# Patient Record
Sex: FEMALE | Race: WHITE | NOT HISPANIC OR LATINO | Employment: FULL TIME | URBAN - METROPOLITAN AREA
[De-identification: names, ages, dates, MRNs, and addresses within clinical notes are randomized per-mention and may not be internally consistent; named-entity substitution may affect disease eponyms.]

---

## 2019-03-27 ENCOUNTER — OFFICE VISIT (OUTPATIENT)
Dept: URGENT CARE | Facility: PHYSICIAN GROUP | Age: 39
End: 2019-03-27
Payer: COMMERCIAL

## 2019-03-27 VITALS
SYSTOLIC BLOOD PRESSURE: 108 MMHG | DIASTOLIC BLOOD PRESSURE: 70 MMHG | TEMPERATURE: 99.2 F | HEART RATE: 88 BPM | BODY MASS INDEX: 33.13 KG/M2 | RESPIRATION RATE: 13 BRPM | WEIGHT: 180 LBS | OXYGEN SATURATION: 98 % | HEIGHT: 62 IN

## 2019-03-27 DIAGNOSIS — M54.50 ACUTE RIGHT-SIDED LOW BACK PAIN WITHOUT SCIATICA: ICD-10-CM

## 2019-03-27 LAB
APPEARANCE UR: CLEAR
BILIRUB UR STRIP-MCNC: NEGATIVE MG/DL
COLOR UR AUTO: YELLOW
GLUCOSE UR STRIP.AUTO-MCNC: NEGATIVE MG/DL
KETONES UR STRIP.AUTO-MCNC: NEGATIVE MG/DL
LEUKOCYTE ESTERASE UR QL STRIP.AUTO: NEGATIVE
NITRITE UR QL STRIP.AUTO: NEGATIVE
PH UR STRIP.AUTO: 7 [PH] (ref 5–8)
PROT UR QL STRIP: NEGATIVE MG/DL
RBC UR QL AUTO: NORMAL
SP GR UR STRIP.AUTO: 1.02
UROBILINOGEN UR STRIP-MCNC: 0.2 MG/DL

## 2019-03-27 PROCEDURE — 99203 OFFICE O/P NEW LOW 30 MIN: CPT | Performed by: PHYSICIAN ASSISTANT

## 2019-03-27 PROCEDURE — 81002 URINALYSIS NONAUTO W/O SCOPE: CPT | Performed by: PHYSICIAN ASSISTANT

## 2019-03-27 ASSESSMENT — ENCOUNTER SYMPTOMS
HEADACHES: 0
FEVER: 0
DIARRHEA: 0
VOMITING: 0
MYALGIAS: 0
ABDOMINAL PAIN: 0

## 2019-03-27 NOTE — PROGRESS NOTES
Subjective:      Johanne Raya is a 39 y.o. female who presents with Flank Pain (Right side onset 9am)            Back Pain   This is a new problem. The current episode started today (approx. 9am). The problem occurs constantly. The problem has been gradually worsening since onset. The pain is present in the lumbar spine. The pain does not radiate. The pain is moderate. The symptoms are aggravated by twisting and position. Pertinent negatives include no abdominal pain, bladder incontinence, bowel incontinence, chest pain, dysuria, fever, headaches, numbness or weakness. She has tried nothing for the symptoms. The treatment provided mild relief.     Patient presents to clinic c/o right sided back/flank pain since 9am. She had a similar pain 1 month prior and was worked up for kidney stones. She reports she didn't have kidney stones at that time, and was diagnosed with a UTI. She reports this pain feels similar, but states she doesn't believe it is kidney stones as there were no stones seen of her previous CT scan. She also reports this pain does not feel like kidney stones. She states her pain is worse with movement and position. She denies radiation of pain, numbness/weakness, of her lower extremities, incontinence of bladder and bowel, and fever.    PMH:  has no past medical history on file.  MEDS: No current outpatient prescriptions on file.  ALLERGIES: No Known Allergies  SURGHX: No past surgical history on file.  SOCHX:  reports that she has been smoking.  She has never used smokeless tobacco.  FH: Family history was reviewed, no pertinent findings to report      Review of Systems   Constitutional: Negative for fever.   HENT: Negative for congestion, ear pain and sore throat.    Eyes: Negative for discharge.   Respiratory: Negative for cough.    Cardiovascular: Negative for chest pain.   Gastrointestinal: Negative for abdominal pain, bowel incontinence, diarrhea and vomiting.   Genitourinary: Positive for flank  "pain (right side). Negative for bladder incontinence, dysuria, frequency and urgency.   Musculoskeletal: Positive for back pain. Negative for myalgias.   Skin: Negative for rash.   Neurological: Negative for focal weakness, weakness, numbness and headaches.          Objective:     /70   Pulse 88   Temp 37.3 °C (99.2 °F)   Resp 13   Ht 1.575 m (5' 2\")   Wt 81.6 kg (180 lb)   SpO2 98%   BMI 32.92 kg/m²      Physical Exam   Constitutional: She is oriented to person, place, and time. She appears well-developed and well-nourished. No distress.   HENT:   Head: Normocephalic and atraumatic.   Right Ear: External ear normal.   Left Ear: External ear normal.   Nose: Nose normal.   Mouth/Throat: Oropharynx is clear and moist.   Eyes: Conjunctivae and EOM are normal.   Neck: Normal range of motion. Neck supple.   Cardiovascular: Normal rate, regular rhythm and normal heart sounds.    Abdominal: There is no tenderness. There is no CVA tenderness.   Musculoskeletal: Normal range of motion. She exhibits no edema.        Arms:  Lumbar Back-   Mild tenderness to right paraspinal region near   Bilateral Lower Extremities  Strength 5/5  ROM intact  Neurovascular intact   Straight Leg Raise - Negative    Neurological: She is alert and oriented to person, place, and time.   Skin: Skin is warm and dry.            Progress:   POCT Urinalysis:  GLU: Negative  ACOSTA: Negative  KET: Negative  S.020  BLO: Trace - patient states she is currently on her period  PH: 7.0  PRO: Negative  URO: 0.2  NIT: Negative  MARTIN: Negative    Urine Culture - pending   Will call patient with the results only if positive and the patient requires antibiotics.        Assessment/Plan:     1. Acute Low Back Pain - Right Side  The patient's presenting symptoms and physical exam are consistent with an acute strain for her lower back. Given the patient is currently not experiencing radiation of pain, numbness/weakness of her lower extremities, " incontinence of bladder/bowel, or saddle paresthesias, and the presence of no focal neurological deficits on physical exam, it is unlikely her symptoms are due to an acute neurological process. The patient is also had a negative straight leg test on physical exam, indicating sciatica is less likely. Given the patient's urinalysis was negative today in clinic, it is unlikely her symptoms are due to an acute urinary tract infection. Additionally, the patient is not experiencing any abdominal pain, dysuria, or fever, and absence of abdominal tenderness on physical exam, which also indicates a UTI is less likely. Will the culture the patient's urine to rule out all other bacterial sources.   Plan:  OTC NSAIDs for pain/discomfort  Alternate ice and heat for symptomatic relief  Follow-up with PCP   Return to clinic or go to the ED if symptoms worsen or fail to improve, or if patient should develop worsening/increasing back pain, numbness/tingling/weakness to her extremities, incontinence of bladder/bowel, saddle paresthesias, radiation of pain, abdominal pain, urinary symptoms, and/or fever.    Discussed plan with the patient, and she agrees to the above.

## 2019-03-28 ASSESSMENT — ENCOUNTER SYMPTOMS
FLANK PAIN: 1
BOWEL INCONTINENCE: 0
FOCAL WEAKNESS: 0
NUMBNESS: 0
BACK PAIN: 1
COUGH: 0
WEAKNESS: 0
EYE DISCHARGE: 0
SORE THROAT: 0

## 2019-09-13 ENCOUNTER — HOSPITAL ENCOUNTER (EMERGENCY)
Facility: MEDICAL CENTER | Age: 39
End: 2019-09-13
Attending: EMERGENCY MEDICINE
Payer: COMMERCIAL

## 2019-09-13 ENCOUNTER — APPOINTMENT (OUTPATIENT)
Dept: RADIOLOGY | Facility: MEDICAL CENTER | Age: 39
End: 2019-09-13
Attending: EMERGENCY MEDICINE
Payer: COMMERCIAL

## 2019-09-13 VITALS
OXYGEN SATURATION: 93 % | HEART RATE: 101 BPM | SYSTOLIC BLOOD PRESSURE: 101 MMHG | HEIGHT: 62 IN | TEMPERATURE: 98.1 F | RESPIRATION RATE: 16 BRPM | WEIGHT: 210 LBS | BODY MASS INDEX: 38.64 KG/M2 | DIASTOLIC BLOOD PRESSURE: 61 MMHG

## 2019-09-13 DIAGNOSIS — R00.0 TACHYCARDIA: ICD-10-CM

## 2019-09-13 DIAGNOSIS — R10.32 LLQ ABDOMINAL PAIN: ICD-10-CM

## 2019-09-13 DIAGNOSIS — R19.7 DIARRHEA, UNSPECIFIED TYPE: ICD-10-CM

## 2019-09-13 LAB
ALBUMIN SERPL BCP-MCNC: 4.7 G/DL (ref 3.2–4.9)
ALBUMIN/GLOB SERPL: 1.6 G/DL
ALP SERPL-CCNC: 63 U/L (ref 30–99)
ALT SERPL-CCNC: 21 U/L (ref 2–50)
ANION GAP SERPL CALC-SCNC: 13 MMOL/L (ref 0–11.9)
APPEARANCE UR: ABNORMAL
AST SERPL-CCNC: 16 U/L (ref 12–45)
BACTERIA #/AREA URNS HPF: ABNORMAL /HPF
BASOPHILS # BLD AUTO: 0.8 % (ref 0–1.8)
BASOPHILS # BLD: 0.09 K/UL (ref 0–0.12)
BILIRUB SERPL-MCNC: 0.3 MG/DL (ref 0.1–1.5)
BILIRUB UR QL STRIP.AUTO: NEGATIVE
BUN SERPL-MCNC: 16 MG/DL (ref 8–22)
CALCIUM SERPL-MCNC: 9 MG/DL (ref 8.5–10.5)
CHLORIDE SERPL-SCNC: 104 MMOL/L (ref 96–112)
CO2 SERPL-SCNC: 23 MMOL/L (ref 20–33)
COLOR UR: YELLOW
CREAT SERPL-MCNC: 0.97 MG/DL (ref 0.5–1.4)
EOSINOPHIL # BLD AUTO: 0.37 K/UL (ref 0–0.51)
EOSINOPHIL NFR BLD: 3.3 % (ref 0–6.9)
EPI CELLS #/AREA URNS HPF: ABNORMAL /HPF
ERYTHROCYTE [DISTWIDTH] IN BLOOD BY AUTOMATED COUNT: 46.4 FL (ref 35.9–50)
GLOBULIN SER CALC-MCNC: 3 G/DL (ref 1.9–3.5)
GLUCOSE SERPL-MCNC: 99 MG/DL (ref 65–99)
GLUCOSE UR STRIP.AUTO-MCNC: NEGATIVE MG/DL
HCG SERPL QL: NEGATIVE
HCT VFR BLD AUTO: 47.2 % (ref 37–47)
HGB BLD-MCNC: 14.9 G/DL (ref 12–16)
HYALINE CASTS #/AREA URNS LPF: ABNORMAL /LPF
IMM GRANULOCYTES # BLD AUTO: 0.11 K/UL (ref 0–0.11)
IMM GRANULOCYTES NFR BLD AUTO: 1 % (ref 0–0.9)
KETONES UR STRIP.AUTO-MCNC: NEGATIVE MG/DL
LEUKOCYTE ESTERASE UR QL STRIP.AUTO: NEGATIVE
LIPASE SERPL-CCNC: 181 U/L (ref 11–82)
LYMPHOCYTES # BLD AUTO: 3.69 K/UL (ref 1–4.8)
LYMPHOCYTES NFR BLD: 33.1 % (ref 22–41)
MCH RBC QN AUTO: 29.7 PG (ref 27–33)
MCHC RBC AUTO-ENTMCNC: 31.6 G/DL (ref 33.6–35)
MCV RBC AUTO: 94.2 FL (ref 81.4–97.8)
MICRO URNS: ABNORMAL
MONOCYTES # BLD AUTO: 0.71 K/UL (ref 0–0.85)
MONOCYTES NFR BLD AUTO: 6.4 % (ref 0–13.4)
NEUTROPHILS # BLD AUTO: 6.18 K/UL (ref 2–7.15)
NEUTROPHILS NFR BLD: 55.4 % (ref 44–72)
NITRITE UR QL STRIP.AUTO: NEGATIVE
NRBC # BLD AUTO: 0 K/UL
NRBC BLD-RTO: 0 /100 WBC
PH UR STRIP.AUTO: 5 [PH] (ref 5–8)
PLATELET # BLD AUTO: 434 K/UL (ref 164–446)
PMV BLD AUTO: 9.6 FL (ref 9–12.9)
POTASSIUM SERPL-SCNC: 3.8 MMOL/L (ref 3.6–5.5)
PROT SERPL-MCNC: 7.7 G/DL (ref 6–8.2)
PROT UR QL STRIP: NEGATIVE MG/DL
RBC # BLD AUTO: 5.01 M/UL (ref 4.2–5.4)
RBC # URNS HPF: ABNORMAL /HPF
RBC UR QL AUTO: NEGATIVE
SODIUM SERPL-SCNC: 140 MMOL/L (ref 135–145)
SP GR UR STRIP.AUTO: 1.01
UROBILINOGEN UR STRIP.AUTO-MCNC: 0.2 MG/DL
WBC # BLD AUTO: 11.2 K/UL (ref 4.8–10.8)
WBC #/AREA URNS HPF: ABNORMAL /HPF

## 2019-09-13 PROCEDURE — 83690 ASSAY OF LIPASE: CPT

## 2019-09-13 PROCEDURE — 99285 EMERGENCY DEPT VISIT HI MDM: CPT

## 2019-09-13 PROCEDURE — 700111 HCHG RX REV CODE 636 W/ 250 OVERRIDE (IP): Performed by: EMERGENCY MEDICINE

## 2019-09-13 PROCEDURE — 85025 COMPLETE CBC W/AUTO DIFF WBC: CPT

## 2019-09-13 PROCEDURE — 96375 TX/PRO/DX INJ NEW DRUG ADDON: CPT

## 2019-09-13 PROCEDURE — 81001 URINALYSIS AUTO W/SCOPE: CPT

## 2019-09-13 PROCEDURE — 74176 CT ABD & PELVIS W/O CONTRAST: CPT

## 2019-09-13 PROCEDURE — 80053 COMPREHEN METABOLIC PANEL: CPT

## 2019-09-13 PROCEDURE — 96374 THER/PROPH/DIAG INJ IV PUSH: CPT

## 2019-09-13 PROCEDURE — 84703 CHORIONIC GONADOTROPIN ASSAY: CPT

## 2019-09-13 RX ORDER — DICYCLOMINE HCL 20 MG
20 TABLET ORAL EVERY 6 HOURS PRN
Qty: 10 TAB | Refills: 1 | Status: SHIPPED | OUTPATIENT
Start: 2019-09-13 | End: 2021-09-09

## 2019-09-13 RX ORDER — KETOROLAC TROMETHAMINE 30 MG/ML
30 INJECTION, SOLUTION INTRAMUSCULAR; INTRAVENOUS ONCE
Status: COMPLETED | OUTPATIENT
Start: 2019-09-13 | End: 2019-09-13

## 2019-09-13 RX ORDER — ONDANSETRON 4 MG/1
4 TABLET, ORALLY DISINTEGRATING ORAL EVERY 8 HOURS PRN
Qty: 8 TAB | Refills: 1 | Status: SHIPPED | OUTPATIENT
Start: 2019-09-13 | End: 2021-09-09

## 2019-09-13 RX ORDER — ONDANSETRON 2 MG/ML
4 INJECTION INTRAMUSCULAR; INTRAVENOUS ONCE
Status: COMPLETED | OUTPATIENT
Start: 2019-09-13 | End: 2019-09-13

## 2019-09-13 RX ORDER — MORPHINE SULFATE 4 MG/ML
2 INJECTION, SOLUTION INTRAMUSCULAR; INTRAVENOUS ONCE
Status: COMPLETED | OUTPATIENT
Start: 2019-09-13 | End: 2019-09-13

## 2019-09-13 RX ADMIN — KETOROLAC TROMETHAMINE 30 MG: 30 INJECTION, SOLUTION INTRAMUSCULAR at 20:49

## 2019-09-13 RX ADMIN — ONDANSETRON 4 MG: 2 INJECTION INTRAMUSCULAR; INTRAVENOUS at 21:25

## 2019-09-13 RX ADMIN — MORPHINE SULFATE 2 MG: 4 INJECTION INTRAVENOUS at 21:25

## 2019-09-13 ASSESSMENT — PAIN SCALES - WONG BAKER
WONGBAKER_NUMERICALRESPONSE: DOESN'T HURT AT ALL
WONGBAKER_NUMERICALRESPONSE: HURTS AS MUCH AS POSSIBLE

## 2019-09-14 NOTE — DISCHARGE INSTRUCTIONS
See a doctor for recheck in 2 days if no improvement    Return sooner if worse or for any concerns

## 2019-09-14 NOTE — ED NOTES
Patient roomed, agree with triage note, patient resting in bed, pain located in LUQ, intermittent, cramping feeling, acute onset

## 2019-09-14 NOTE — ED NOTES
The patient has been provided with discharge education and information.  The patient was also provided with instructions on follow up care and return precautions.  The patient verbalizes understanding of discharge instructions, follow up care, and return precautions.  All questions have been answered.  Two RX written by ERP.  YUNI, ALOK/Ox4, good color and appropriate at time of discharge.  Patient ambulated steady gait  out of department.   is driving patient home.  ERP aware of HR

## 2019-09-14 NOTE — ED TRIAGE NOTES
"Chief Complaint   Patient presents with   • LLQ Pain     PT reports stabbing pain to LLQ, reports hx kidney stones. Denies trauma to area. Pain started 1 hour PTA. Pt denies blood in urine, denies difficulty urinating. Denies abnormal vaginal discharge.     /110   Pulse (!) 146   Temp 36.7 °C (98.1 °F) (Temporal)   Resp 20 Comment: Pt. Crying  Ht 1.575 m (5' 2\")   Wt 95.3 kg (210 lb)   SpO2 92%   BMI 38.41 kg/m²     PT to ER for above complaint.    R /110.   L /104.     Protocol ordered. Triage processed explained.   "

## 2019-09-14 NOTE — ED PROVIDER NOTES
ED Provider Note    CHIEF COMPLAINT  Chief Complaint   Patient presents with   • LLQ Pain     PT reports stabbing pain to LLQ, reports hx kidney stones. Denies trauma to area. Pain started 1 hour PTA. Pt denies blood in urine, denies difficulty urinating. Denies abnormal vaginal discharge.       HPI  Johanne Raya is a 39 y.o. female who presents with complaint of left lower quadrant abdominal pain intermittently radiating to the left flank and suprapubically.  She states cramping is severe, sharp in nature, and intermittent.  She has had 6 loose bowel movements today.  Symptoms are sudden onset today.  She states she felt well this morning when she got up.  No chest pain, no shortness of breath.  She denies fever or chills.  No vomiting.    REVIEW OF SYSTEMS  Constitutional: No fever  Respiratory: No shortness of breath  Cardiac: No chest pain or syncope  Gastrointestinal: Abdominal cramping and pain, diarrhea.  Prior history of pancreatitis.  She is previously had her appendix removed.  Musculoskeletal: Intermittent left flank pain  Neurologic: No headache  Genitourinary: Denies dysuria.  Distant history of kidney stone 8 years ago       All other systems are negative.     PAST MEDICAL HISTORY  History reviewed. No pertinent past medical history.    FAMILY HISTORY  History reviewed. No pertinent family history.    SOCIAL HISTORY  Social History     Socioeconomic History   • Marital status: Single     Spouse name: Not on file   • Number of children: Not on file   • Years of education: Not on file   • Highest education level: Not on file   Occupational History   • Not on file   Social Needs   • Financial resource strain: Not on file   • Food insecurity:     Worry: Not on file     Inability: Not on file   • Transportation needs:     Medical: Not on file     Non-medical: Not on file   Tobacco Use   • Smoking status: Current Every Day Smoker   • Smokeless tobacco: Never Used   Substance and Sexual Activity   • Alcohol  "use: Yes   • Drug use: Never   • Sexual activity: Not on file   Lifestyle   • Physical activity:     Days per week: Not on file     Minutes per session: Not on file   • Stress: Not on file   Relationships   • Social connections:     Talks on phone: Not on file     Gets together: Not on file     Attends Congregational service: Not on file     Active member of club or organization: Not on file     Attends meetings of clubs or organizations: Not on file     Relationship status: Not on file   • Intimate partner violence:     Fear of current or ex partner: Not on file     Emotionally abused: Not on file     Physically abused: Not on file     Forced sexual activity: Not on file   Other Topics Concern   • Not on file   Social History Narrative   • Not on file       SURGICAL HISTORY  History reviewed. No pertinent surgical history.    CURRENT MEDICATIONS  Home Medications    **Home medications have not yet been reviewed for this encounter**         ALLERGIES  No Known Allergies    PHYSICAL EXAM  VITAL SIGNS: /110   Pulse (!) 146   Temp 36.7 °C (98.1 °F) (Temporal)   Resp 20 Comment: Pt. Crying  Ht 1.575 m (5' 2\")   Wt 95.3 kg (210 lb)   SpO2 92%   BMI 38.41 kg/m²   Constitutional: Well-nourished, moderate distress  ENT: Nares clear, mucous membranes moist.  Eyes:  Conjunctiva normal, No discharge.    Lymphatic: No adenopathy.   Cardiovascular: Tachycardic heart rate, Normal rhythm.   Pulmonary: No wheezing, no rales  Gastrointestinal: Abdomen is soft, tender suprapubic and left lower quadrant, no peritoneal signs.  Bowel sounds active  Skin: Warm, Dry, No jaundice.   Musculoskeletal:  No CVA tenderness.   Neurologic:  Normal motor and sensory function, No focal deficits noted.   Psychiatric:Normal affect, Normal mood.    RADIOLOGY/PROCEDURES/Labs  Results for orders placed or performed during the hospital encounter of 09/13/19   CBC WITH DIFFERENTIAL   Result Value Ref Range    WBC 11.2 (H) 4.8 - 10.8 K/uL    RBC " 5.01 4.20 - 5.40 M/uL    Hemoglobin 14.9 12.0 - 16.0 g/dL    Hematocrit 47.2 (H) 37.0 - 47.0 %    MCV 94.2 81.4 - 97.8 fL    MCH 29.7 27.0 - 33.0 pg    MCHC 31.6 (L) 33.6 - 35.0 g/dL    RDW 46.4 35.9 - 50.0 fL    Platelet Count 434 164 - 446 K/uL    MPV 9.6 9.0 - 12.9 fL    Neutrophils-Polys 55.40 44.00 - 72.00 %    Lymphocytes 33.10 22.00 - 41.00 %    Monocytes 6.40 0.00 - 13.40 %    Eosinophils 3.30 0.00 - 6.90 %    Basophils 0.80 0.00 - 1.80 %    Immature Granulocytes 1.00 (H) 0.00 - 0.90 %    Nucleated RBC 0.00 /100 WBC    Neutrophils (Absolute) 6.18 2.00 - 7.15 K/uL    Lymphs (Absolute) 3.69 1.00 - 4.80 K/uL    Monos (Absolute) 0.71 0.00 - 0.85 K/uL    Eos (Absolute) 0.37 0.00 - 0.51 K/uL    Baso (Absolute) 0.09 0.00 - 0.12 K/uL    Immature Granulocytes (abs) 0.11 0.00 - 0.11 K/uL    NRBC (Absolute) 0.00 K/uL   COMP METABOLIC PANEL   Result Value Ref Range    Sodium 140 135 - 145 mmol/L    Potassium 3.8 3.6 - 5.5 mmol/L    Chloride 104 96 - 112 mmol/L    Co2 23 20 - 33 mmol/L    Anion Gap 13.0 (H) 0.0 - 11.9    Glucose 99 65 - 99 mg/dL    Bun 16 8 - 22 mg/dL    Creatinine 0.97 0.50 - 1.40 mg/dL    Calcium 9.0 8.5 - 10.5 mg/dL    AST(SGOT) 16 12 - 45 U/L    ALT(SGPT) 21 2 - 50 U/L    Alkaline Phosphatase 63 30 - 99 U/L    Total Bilirubin 0.3 0.1 - 1.5 mg/dL    Albumin 4.7 3.2 - 4.9 g/dL    Total Protein 7.7 6.0 - 8.2 g/dL    Globulin 3.0 1.9 - 3.5 g/dL    A-G Ratio 1.6 g/dL   LIPASE   Result Value Ref Range    Lipase 181 (H) 11 - 82 U/L   HCG QUAL SERUM   Result Value Ref Range    Beta-Hcg Qualitative Serum Negative Negative   URINALYSIS,CULTURE IF INDICATED   Result Value Ref Range    Micro Urine Req Microscopic    ESTIMATED GFR   Result Value Ref Range    GFR If African American >60 >60 mL/min/1.73 m 2    GFR If Non African American >60 >60 mL/min/1.73 m 2     CT-RENAL COLIC EVALUATION(A/P W/O)   Final Result         1. No urinary tract calculus identified. No renal collecting system in dilatation.      2.  Mildly atrophic left kidney.            COURSE & MEDICAL DECISION MAKING  Pertinent Labs & Imaging studies reviewed. (See chart for details)  Patient with negative urine, negative CT scan, minimal elevation of white blood cell count without obvious source for left lower quadrant abdominal pain.  No evidence of diverticulitis, no kidney stone.  Patient did show elevated lipase although states an episode of pancreatitis in the past, lipase was as high as 800.  Her pain is currently mostly left lower quadrant, less likely to be secondary to pancreatitis.  I suspect food poisoning given the sudden onset of intermittent abdominal cramping with associated diarrhea.  Patient feels better after Toradol and 2 mg of morphine.  She will use Bentyl for abdominal cramping at home, Zofran for nausea as needed.  Her tachycardia improved, initially 145, down to 105 after pain medication.  There is no evidence of overt dehydration at this time, patient showing normal BUN and creatinine.  She is agreed to see  for recheck in 2 days if no better and to return if worse.    FINAL IMPRESSION  1. LLQ abdominal pain     2. Diarrhea, unspecified type     3. Tachycardia             Electronically signed by: Andrea Huggins, 9/13/2019 9:33 PM

## 2019-11-06 ENCOUNTER — OFFICE VISIT (OUTPATIENT)
Dept: URGENT CARE | Facility: PHYSICIAN GROUP | Age: 39
End: 2019-11-06
Payer: COMMERCIAL

## 2019-11-06 VITALS
BODY MASS INDEX: 43.06 KG/M2 | RESPIRATION RATE: 16 BRPM | TEMPERATURE: 97.2 F | HEIGHT: 62 IN | WEIGHT: 234 LBS | DIASTOLIC BLOOD PRESSURE: 84 MMHG | SYSTOLIC BLOOD PRESSURE: 122 MMHG | OXYGEN SATURATION: 96 % | HEART RATE: 98 BPM

## 2019-11-06 DIAGNOSIS — F41.1 GAD (GENERALIZED ANXIETY DISORDER): ICD-10-CM

## 2019-11-06 DIAGNOSIS — F41.0 PANIC ATTACK: ICD-10-CM

## 2019-11-06 PROCEDURE — 99214 OFFICE O/P EST MOD 30 MIN: CPT | Performed by: FAMILY MEDICINE

## 2019-11-06 RX ORDER — ALPRAZOLAM 0.5 MG/1
0.5 TABLET ORAL NIGHTLY PRN
Qty: 10 TAB | Refills: 0 | Status: SHIPPED | OUTPATIENT
Start: 2019-11-06 | End: 2019-11-16

## 2019-11-06 RX ORDER — CITALOPRAM 20 MG/1
20 TABLET ORAL DAILY
Qty: 30 TAB | Refills: 1 | Status: SHIPPED | OUTPATIENT
Start: 2019-11-06 | End: 2021-09-09

## 2019-11-06 ASSESSMENT — ENCOUNTER SYMPTOMS
EYE DISCHARGE: 0
EYE REDNESS: 0
VOMITING: 0
WEIGHT LOSS: 0
MYALGIAS: 0
NAUSEA: 0
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
## 2019-11-06 NOTE — LETTER
November 6, 2019         Patient: Johanne Raya   YOB: 1980   Date of Visit: 11/6/2019           To Whom it May Concern:    Johanne Raya was seen in my clinic on 11/6/2019. Please excuse today.       Sincerely,           Claudy Romero M.D.  Electronically Signed

## 2020-06-04 ENCOUNTER — OFFICE VISIT (OUTPATIENT)
Dept: URGENT CARE | Facility: PHYSICIAN GROUP | Age: 40
End: 2020-06-04
Payer: COMMERCIAL

## 2020-06-04 VITALS
RESPIRATION RATE: 16 BRPM | OXYGEN SATURATION: 98 % | BODY MASS INDEX: 48.03 KG/M2 | HEART RATE: 98 BPM | SYSTOLIC BLOOD PRESSURE: 112 MMHG | DIASTOLIC BLOOD PRESSURE: 68 MMHG | WEIGHT: 261 LBS | TEMPERATURE: 97.5 F | HEIGHT: 62 IN

## 2020-06-04 DIAGNOSIS — M72.2 PLANTAR FASCIITIS: ICD-10-CM

## 2020-06-04 DIAGNOSIS — M62.830 LUMBAR PARASPINAL MUSCLE SPASM: ICD-10-CM

## 2020-06-04 PROCEDURE — 99214 OFFICE O/P EST MOD 30 MIN: CPT | Performed by: FAMILY MEDICINE

## 2020-06-04 RX ORDER — CYCLOBENZAPRINE HCL 10 MG
10 TABLET ORAL 3 TIMES DAILY PRN
Qty: 30 TAB | Refills: 0 | Status: SHIPPED | OUTPATIENT
Start: 2020-06-04 | End: 2021-09-09

## 2020-06-04 RX ORDER — IBUPROFEN 200 MG
200 TABLET ORAL EVERY 6 HOURS PRN
COMMUNITY
End: 2021-09-09

## 2020-06-04 SDOH — HEALTH STABILITY: MENTAL HEALTH: HOW OFTEN DO YOU HAVE A DRINK CONTAINING ALCOHOL?: MONTHLY OR LESS

## 2020-06-04 ASSESSMENT — PAIN SCALES - GENERAL: PAINLEVEL: 8=MODERATE-SEVERE PAIN

## 2020-06-04 ASSESSMENT — FIBROSIS 4 INDEX: FIB4 SCORE: 0.33

## 2020-06-05 NOTE — PROGRESS NOTES
"    Back Pain  This is a new problem. The current episode started in the past 3 wks.   She feels it is worse from being on her feet all day.  . The problem occurs constantly. The problem is unchanged. The pain is present in the lumbar spine. The quality of the pain is described as aching. The pain does not radiate. The pain is moderate. The symptoms are aggravated by position. Pertinent negatives include no bladder incontinence, bowel incontinence, dysuria, fever, headaches, numbness or weakness. Treatments tried: motrin.  The treatment provided no relief.         #2.   C/o bilat, dull, achy heel pain worse with standing and walking x 1 wk.     Social History     Tobacco Use   • Smoking status: Current Every Day Smoker     Packs/day: 0.00     Types: Cigarettes   • Smokeless tobacco: Never Used   Substance Use Topics   • Alcohol use: Yes     Frequency: Monthly or less   • Drug use: Never       Family hx was reviewed - no pertinent past family hx      Past medical history was unremarkable and not pertinent to current issue    Review of Systems   Constitutional: Negative for fever.   Gastrointestinal: Negative for bowel incontinence.   Genitourinary: Negative for bladder incontinence, dysuria, urgency and frequency.   Musculoskeletal: Positive for back pain.   Neurological: Negative for weakness, numbness and headaches.   All other systems reviewed and are negative.         Objective:     /68   Pulse 98   Temp 36.4 °C (97.5 °F) (Temporal)   Resp 16   Ht 1.575 m (5' 2\")   Wt 118.4 kg (261 lb)   SpO2 98%     Physical Exam   Constitutional: pt is oriented to person, place, and time. Pt appears well-developed and well-nourished. No distress.   HENT:   Head: Normocephalic and atraumatic.   Eyes: EOM are normal. Pupils are equal, round, and reactive to light. No scleral icterus.   Neck: Normal range of motion. Neck supple. No thyromegaly present.   Cardiovascular: Normal rate, regular rhythm and normal heart " sounds.    Pulmonary/Chest: Effort normal and breath sounds normal. No respiratory distress.  no wheezes.   no rales.  no tenderness.   Musculoskeletal: pt exhibits no edema.        Right knee: Normal.        Left knee: Normal.               Lumbar back: pt exhibits decreased range of motion, spasm and tenderness . Pt exhibits no bony tenderness, no swelling, no edema, no deformity  .   Neurological: patient is alert and oriented to person, place, and time. Patient has normal reflexes. No cranial nerve deficit. Patient exhibits normal muscle tone.   Reflex Scores:       Patellar reflexes are 2+ on the right side and 2+ on the left side.  STRAIGHT LEG RAISE, JYOTSNA NEGATIVE BILAT  Feet:   There is some trace edema and + TTP over both heels.     Skin: Skin is warm and dry. No rash noted. No erythema.   Psychiatric: patient has a normal mood and affect.  behavior is normal.   Nursing note and vitals reviewed.              Assessment/Plan:       1. Lumbar paraspinal muscle spasm     - Diclofenac Sodium (VOLTAREN) 1 % Gel; Apply 4 g to skin as directed 3 times a day as needed.  Dispense: 1 Tube; Refill: 0  - cyclobenzaprine (FLEXERIL) 10 MG Tab; Take 1 Tab by mouth 3 times a day as needed.  Dispense: 30 Tab; Refill: 0    2. Plantar fasciitis   advised supportive shoes with arch support    - Diclofenac Sodium (VOLTAREN) 1 % Gel; Apply 4 g to skin as directed 3 times a day as needed.  Dispense: 1 Tube; Refill: 0       Follow up in one week if no improvement, sooner if symptoms worsen.

## 2021-06-02 ENCOUNTER — HOSPITAL ENCOUNTER (OUTPATIENT)
Dept: LAB | Facility: MEDICAL CENTER | Age: 41
End: 2021-06-02
Attending: COLON & RECTAL SURGERY
Payer: COMMERCIAL

## 2021-06-02 ENCOUNTER — HOSPITAL ENCOUNTER (OUTPATIENT)
Dept: CARDIOLOGY | Facility: MEDICAL CENTER | Age: 41
End: 2021-06-02
Attending: COLON & RECTAL SURGERY
Payer: COMMERCIAL

## 2021-06-02 ENCOUNTER — HOSPITAL ENCOUNTER (OUTPATIENT)
Dept: RADIOLOGY | Facility: MEDICAL CENTER | Age: 41
End: 2021-06-02
Attending: COLON & RECTAL SURGERY
Payer: COMMERCIAL

## 2021-06-02 DIAGNOSIS — Z01.818 PREOP EXAMINATION: ICD-10-CM

## 2021-06-02 DIAGNOSIS — E66.01 MORBID OBESITY (HCC): ICD-10-CM

## 2021-06-02 LAB
ALBUMIN SERPL BCP-MCNC: 4.1 G/DL (ref 3.2–4.9)
ALBUMIN/GLOB SERPL: 1.5 G/DL
ALP SERPL-CCNC: 81 U/L (ref 30–99)
ALT SERPL-CCNC: 21 U/L (ref 2–50)
ANION GAP SERPL CALC-SCNC: 8 MMOL/L (ref 7–16)
AST SERPL-CCNC: 16 U/L (ref 12–45)
BASOPHILS # BLD AUTO: 0.5 % (ref 0–1.8)
BASOPHILS # BLD: 0.05 K/UL (ref 0–0.12)
BILIRUB SERPL-MCNC: 0.2 MG/DL (ref 0.1–1.5)
BUN SERPL-MCNC: 13 MG/DL (ref 8–22)
CALCIUM SERPL-MCNC: 9 MG/DL (ref 8.5–10.5)
CHLORIDE SERPL-SCNC: 107 MMOL/L (ref 96–112)
CO2 SERPL-SCNC: 23 MMOL/L (ref 20–33)
CREAT SERPL-MCNC: 0.83 MG/DL (ref 0.5–1.4)
EKG IMPRESSION: NORMAL
EOSINOPHIL # BLD AUTO: 0.15 K/UL (ref 0–0.51)
EOSINOPHIL NFR BLD: 1.5 % (ref 0–6.9)
ERYTHROCYTE [DISTWIDTH] IN BLOOD BY AUTOMATED COUNT: 48.5 FL (ref 35.9–50)
EST. AVERAGE GLUCOSE BLD GHB EST-MCNC: 103 MG/DL
GLOBULIN SER CALC-MCNC: 2.8 G/DL (ref 1.9–3.5)
GLUCOSE SERPL-MCNC: 99 MG/DL (ref 65–99)
HBA1C MFR BLD: 5.2 % (ref 4–5.6)
HCT VFR BLD AUTO: 46.2 % (ref 37–47)
HGB BLD-MCNC: 14.6 G/DL (ref 12–16)
IMM GRANULOCYTES # BLD AUTO: 0.12 K/UL (ref 0–0.11)
IMM GRANULOCYTES NFR BLD AUTO: 1.2 % (ref 0–0.9)
LYMPHOCYTES # BLD AUTO: 2.27 K/UL (ref 1–4.8)
LYMPHOCYTES NFR BLD: 23.3 % (ref 22–41)
MCH RBC QN AUTO: 29 PG (ref 27–33)
MCHC RBC AUTO-ENTMCNC: 31.6 G/DL (ref 33.6–35)
MCV RBC AUTO: 91.8 FL (ref 81.4–97.8)
MONOCYTES # BLD AUTO: 0.53 K/UL (ref 0–0.85)
MONOCYTES NFR BLD AUTO: 5.4 % (ref 0–13.4)
NEUTROPHILS # BLD AUTO: 6.63 K/UL (ref 2–7.15)
NEUTROPHILS NFR BLD: 68.1 % (ref 44–72)
NRBC # BLD AUTO: 0 K/UL
NRBC BLD-RTO: 0 /100 WBC
PLATELET # BLD AUTO: 338 K/UL (ref 164–446)
PMV BLD AUTO: 10.9 FL (ref 9–12.9)
POTASSIUM SERPL-SCNC: 4.4 MMOL/L (ref 3.6–5.5)
PROT SERPL-MCNC: 6.9 G/DL (ref 6–8.2)
RBC # BLD AUTO: 5.03 M/UL (ref 4.2–5.4)
SODIUM SERPL-SCNC: 138 MMOL/L (ref 135–145)
TSH SERPL DL<=0.005 MIU/L-ACNC: 1.99 UIU/ML (ref 0.38–5.33)
WBC # BLD AUTO: 9.8 K/UL (ref 4.8–10.8)

## 2021-06-02 PROCEDURE — 71046 X-RAY EXAM CHEST 2 VIEWS: CPT

## 2021-06-02 PROCEDURE — 84443 ASSAY THYROID STIM HORMONE: CPT

## 2021-06-02 PROCEDURE — 83036 HEMOGLOBIN GLYCOSYLATED A1C: CPT

## 2021-06-02 PROCEDURE — 93005 ELECTROCARDIOGRAM TRACING: CPT | Performed by: COLON & RECTAL SURGERY

## 2021-06-02 PROCEDURE — 93010 ELECTROCARDIOGRAM REPORT: CPT | Performed by: INTERNAL MEDICINE

## 2021-06-02 PROCEDURE — 80053 COMPREHEN METABOLIC PANEL: CPT

## 2021-06-02 PROCEDURE — 74240 X-RAY XM UPR GI TRC 1CNTRST: CPT

## 2021-06-02 PROCEDURE — 36415 COLL VENOUS BLD VENIPUNCTURE: CPT

## 2021-06-02 PROCEDURE — 85025 COMPLETE CBC W/AUTO DIFF WBC: CPT

## 2021-07-31 ENCOUNTER — HOSPITAL ENCOUNTER (EMERGENCY)
Dept: HOSPITAL 8 - ED | Age: 41
Discharge: HOME | End: 2021-07-31
Payer: COMMERCIAL

## 2021-07-31 VITALS — SYSTOLIC BLOOD PRESSURE: 110 MMHG | DIASTOLIC BLOOD PRESSURE: 86 MMHG

## 2021-07-31 VITALS — HEIGHT: 62 IN | BODY MASS INDEX: 46.66 KG/M2 | WEIGHT: 253.53 LBS
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
## 2021-07-31 DIAGNOSIS — F10.120: ICD-10-CM
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
## 2021-07-31 DIAGNOSIS — G89.11: Primary | ICD-10-CM
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
## 2021-07-31 DIAGNOSIS — M25.532: ICD-10-CM

## 2021-07-31 PROCEDURE — 99283 EMERGENCY DEPT VISIT LOW MDM: CPT

## 2021-09-09 ENCOUNTER — PRE-ADMISSION TESTING (OUTPATIENT)
Dept: ADMISSIONS | Facility: MEDICAL CENTER | Age: 41
DRG: 621 | End: 2021-09-09
Attending: COLON & RECTAL SURGERY
Payer: COMMERCIAL

## 2021-09-09 DIAGNOSIS — Z01.812 PRE-OPERATIVE LABORATORY EXAMINATION: ICD-10-CM

## 2021-09-09 LAB
ALBUMIN SERPL BCP-MCNC: 4.3 G/DL (ref 3.2–4.9)
ALBUMIN/GLOB SERPL: 1.3 G/DL
ALP SERPL-CCNC: 85 U/L (ref 30–99)
ALT SERPL-CCNC: 16 U/L (ref 2–50)
ANION GAP SERPL CALC-SCNC: 9 MMOL/L (ref 7–16)
AST SERPL-CCNC: 16 U/L (ref 12–45)
BASOPHILS # BLD AUTO: 0.5 % (ref 0–1.8)
BASOPHILS # BLD: 0.05 K/UL (ref 0–0.12)
BILIRUB SERPL-MCNC: 0.4 MG/DL (ref 0.1–1.5)
BUN SERPL-MCNC: 17 MG/DL (ref 8–22)
CALCIUM SERPL-MCNC: 9.4 MG/DL (ref 8.5–10.5)
CHLORIDE SERPL-SCNC: 101 MMOL/L (ref 96–112)
CO2 SERPL-SCNC: 28 MMOL/L (ref 20–33)
CREAT SERPL-MCNC: 0.93 MG/DL (ref 0.5–1.4)
EOSINOPHIL # BLD AUTO: 0.12 K/UL (ref 0–0.51)
EOSINOPHIL NFR BLD: 1.2 % (ref 0–6.9)
ERYTHROCYTE [DISTWIDTH] IN BLOOD BY AUTOMATED COUNT: 47.2 FL (ref 35.9–50)
GLOBULIN SER CALC-MCNC: 3.2 G/DL (ref 1.9–3.5)
GLUCOSE SERPL-MCNC: 91 MG/DL (ref 65–99)
HCT VFR BLD AUTO: 44.5 % (ref 37–47)
HGB BLD-MCNC: 14.6 G/DL (ref 12–16)
IMM GRANULOCYTES # BLD AUTO: 0.11 K/UL (ref 0–0.11)
IMM GRANULOCYTES NFR BLD AUTO: 1.1 % (ref 0–0.9)
INR PPP: 1.11 (ref 0.87–1.13)
LYMPHOCYTES # BLD AUTO: 2.2 K/UL (ref 1–4.8)
LYMPHOCYTES NFR BLD: 21.3 % (ref 22–41)
MCH RBC QN AUTO: 29.3 PG (ref 27–33)
MCHC RBC AUTO-ENTMCNC: 32.8 G/DL (ref 33.6–35)
MCV RBC AUTO: 89.2 FL (ref 81.4–97.8)
MONOCYTES # BLD AUTO: 0.66 K/UL (ref 0–0.85)
MONOCYTES NFR BLD AUTO: 6.4 % (ref 0–13.4)
NEUTROPHILS # BLD AUTO: 7.19 K/UL (ref 2–7.15)
NEUTROPHILS NFR BLD: 69.5 % (ref 44–72)
NRBC # BLD AUTO: 0 K/UL
NRBC BLD-RTO: 0 /100 WBC
PLATELET # BLD AUTO: 343 K/UL (ref 164–446)
PMV BLD AUTO: 10.7 FL (ref 9–12.9)
POTASSIUM SERPL-SCNC: 4.1 MMOL/L (ref 3.6–5.5)
PROT SERPL-MCNC: 7.5 G/DL (ref 6–8.2)
PROTHROMBIN TIME: 14 SEC (ref 12–14.6)
RBC # BLD AUTO: 4.99 M/UL (ref 4.2–5.4)
SODIUM SERPL-SCNC: 138 MMOL/L (ref 135–145)
WBC # BLD AUTO: 10.3 K/UL (ref 4.8–10.8)

## 2021-09-09 PROCEDURE — 36415 COLL VENOUS BLD VENIPUNCTURE: CPT

## 2021-09-09 PROCEDURE — C9803 HOPD COVID-19 SPEC COLLECT: HCPCS

## 2021-09-09 PROCEDURE — 85025 COMPLETE CBC W/AUTO DIFF WBC: CPT

## 2021-09-09 PROCEDURE — U0005 INFEC AGEN DETEC AMPLI PROBE: HCPCS

## 2021-09-09 PROCEDURE — 85610 PROTHROMBIN TIME: CPT

## 2021-09-09 PROCEDURE — 80053 COMPREHEN METABOLIC PANEL: CPT

## 2021-09-09 PROCEDURE — U0003 INFECTIOUS AGENT DETECTION BY NUCLEIC ACID (DNA OR RNA); SEVERE ACUTE RESPIRATORY SYNDROME CORONAVIRUS 2 (SARS-COV-2) (CORONAVIRUS DISEASE [COVID-19]), AMPLIFIED PROBE TECHNIQUE, MAKING USE OF HIGH THROUGHPUT TECHNOLOGIES AS DESCRIBED BY CMS-2020-01-R: HCPCS

## 2021-09-09 RX ORDER — CITALOPRAM 40 MG/1
40 TABLET ORAL EVERY MORNING
COMMUNITY
End: 2023-04-06

## 2021-09-09 ASSESSMENT — FIBROSIS 4 INDEX: FIB4 SCORE: 0.43

## 2021-09-10 LAB
SARS-COV-2 RNA RESP QL NAA+PROBE: NOTDETECTED
SPECIMEN SOURCE: NORMAL

## 2021-09-14 NOTE — OR NURSING
COVID-19 Pre-surgery screenin. Do you have an undiagnosed respiratory illness or symptoms such as coughing or sneezing? No  a. Onset of Sx  n/a  b. Acute vs. chronic respiratory illness  n/a    2. Do you have an unexplained fever greater than 100.4 degrees Fahrenheit or 38 degrees Celsius?  No    3. Have you had direct exposure to a patient who tested positive for Covid-19? No     4. Have you had any loss of your sense of taste or smell, N/V or sore throat? No    Patient has been informed of 2 visitor policy and asked to wear a mask at all times.  Eating/drinking are only allowed in dining areas.   Yes

## 2021-09-15 ENCOUNTER — HOSPITAL ENCOUNTER (INPATIENT)
Facility: MEDICAL CENTER | Age: 41
LOS: 1 days | DRG: 621 | End: 2021-09-16
Attending: COLON & RECTAL SURGERY | Admitting: COLON & RECTAL SURGERY
Payer: COMMERCIAL

## 2021-09-15 ENCOUNTER — ANESTHESIA EVENT (OUTPATIENT)
Dept: SURGERY | Facility: MEDICAL CENTER | Age: 41
DRG: 621 | End: 2021-09-15
Payer: COMMERCIAL

## 2021-09-15 ENCOUNTER — ANESTHESIA (OUTPATIENT)
Dept: SURGERY | Facility: MEDICAL CENTER | Age: 41
DRG: 621 | End: 2021-09-15
Payer: COMMERCIAL

## 2021-09-15 LAB — HCG UR QL: NEGATIVE

## 2021-09-15 PROCEDURE — 700105 HCHG RX REV CODE 258: Performed by: COLON & RECTAL SURGERY

## 2021-09-15 PROCEDURE — 700101 HCHG RX REV CODE 250: Performed by: COLON & RECTAL SURGERY

## 2021-09-15 PROCEDURE — 160029 HCHG SURGERY MINUTES - 1ST 30 MINS LEVEL 4: Performed by: COLON & RECTAL SURGERY

## 2021-09-15 PROCEDURE — 700102 HCHG RX REV CODE 250 W/ 637 OVERRIDE(OP): Performed by: COLON & RECTAL SURGERY

## 2021-09-15 PROCEDURE — A9270 NON-COVERED ITEM OR SERVICE: HCPCS | Performed by: PHYSICIAN ASSISTANT

## 2021-09-15 PROCEDURE — 700102 HCHG RX REV CODE 250 W/ 637 OVERRIDE(OP): Performed by: PHYSICIAN ASSISTANT

## 2021-09-15 PROCEDURE — 501583 HCHG TROCAR, THRD CAN&SEAL 5X100: Performed by: COLON & RECTAL SURGERY

## 2021-09-15 PROCEDURE — 700111 HCHG RX REV CODE 636 W/ 250 OVERRIDE (IP): Performed by: PHYSICIAN ASSISTANT

## 2021-09-15 PROCEDURE — 160041 HCHG SURGERY MINUTES - EA ADDL 1 MIN LEVEL 4: Performed by: COLON & RECTAL SURGERY

## 2021-09-15 PROCEDURE — 770006 HCHG ROOM/CARE - MED/SURG/GYN SEMI*

## 2021-09-15 PROCEDURE — A9270 NON-COVERED ITEM OR SERVICE: HCPCS | Performed by: COLON & RECTAL SURGERY

## 2021-09-15 PROCEDURE — 160035 HCHG PACU - 1ST 60 MINS PHASE I: Performed by: COLON & RECTAL SURGERY

## 2021-09-15 PROCEDURE — 501338 HCHG SHEARS, ENDO: Performed by: COLON & RECTAL SURGERY

## 2021-09-15 PROCEDURE — 700105 HCHG RX REV CODE 258: Performed by: PHYSICIAN ASSISTANT

## 2021-09-15 PROCEDURE — 502570 HCHG PACK, GASTRIC BANDING: Performed by: COLON & RECTAL SURGERY

## 2021-09-15 PROCEDURE — 502240 HCHG MISC OR SUPPLY RC 0272: Performed by: COLON & RECTAL SURGERY

## 2021-09-15 PROCEDURE — 501838 HCHG SUTURE GENERAL: Performed by: COLON & RECTAL SURGERY

## 2021-09-15 PROCEDURE — 88307 TISSUE EXAM BY PATHOLOGIST: CPT

## 2021-09-15 PROCEDURE — 160009 HCHG ANES TIME/MIN: Performed by: COLON & RECTAL SURGERY

## 2021-09-15 PROCEDURE — 501570 HCHG TROCAR, SEPARATOR: Performed by: COLON & RECTAL SURGERY

## 2021-09-15 PROCEDURE — 700111 HCHG RX REV CODE 636 W/ 250 OVERRIDE (IP): Performed by: ANESTHESIOLOGY

## 2021-09-15 PROCEDURE — 501399 HCHG SPECIMAN BAG, ENDO CATC: Performed by: COLON & RECTAL SURGERY

## 2021-09-15 PROCEDURE — 700101 HCHG RX REV CODE 250: Performed by: ANESTHESIOLOGY

## 2021-09-15 PROCEDURE — 700111 HCHG RX REV CODE 636 W/ 250 OVERRIDE (IP): Performed by: COLON & RECTAL SURGERY

## 2021-09-15 PROCEDURE — 160036 HCHG PACU - EA ADDL 30 MINS PHASE I: Performed by: COLON & RECTAL SURGERY

## 2021-09-15 PROCEDURE — 0DB64Z3 EXCISION OF STOMACH, PERCUTANEOUS ENDOSCOPIC APPROACH, VERTICAL: ICD-10-PCS | Performed by: COLON & RECTAL SURGERY

## 2021-09-15 PROCEDURE — 501497 HCHG SURGICLIP: Performed by: COLON & RECTAL SURGERY

## 2021-09-15 PROCEDURE — 160048 HCHG OR STATISTICAL LEVEL 1-5: Performed by: COLON & RECTAL SURGERY

## 2021-09-15 PROCEDURE — 160002 HCHG RECOVERY MINUTES (STAT): Performed by: COLON & RECTAL SURGERY

## 2021-09-15 PROCEDURE — 81025 URINE PREGNANCY TEST: CPT

## 2021-09-15 RX ORDER — OXYCODONE HCL 5 MG/5 ML
10 SOLUTION, ORAL ORAL
Status: DISCONTINUED | OUTPATIENT
Start: 2021-09-15 | End: 2021-09-16 | Stop reason: HOSPADM

## 2021-09-15 RX ORDER — ACETAMINOPHEN 10 MG/ML
1 INJECTION, SOLUTION INTRAVENOUS ONCE
Status: COMPLETED | OUTPATIENT
Start: 2021-09-15 | End: 2021-09-15

## 2021-09-15 RX ORDER — SCOLOPAMINE TRANSDERMAL SYSTEM 1 MG/1
1 PATCH, EXTENDED RELEASE TRANSDERMAL ONCE
Status: DISCONTINUED | OUTPATIENT
Start: 2021-09-15 | End: 2021-09-16 | Stop reason: HOSPADM

## 2021-09-15 RX ORDER — BUPIVACAINE HYDROCHLORIDE AND EPINEPHRINE 5; 5 MG/ML; UG/ML
INJECTION, SOLUTION PERINEURAL
Status: DISCONTINUED | OUTPATIENT
Start: 2021-09-15 | End: 2021-09-15 | Stop reason: HOSPADM

## 2021-09-15 RX ORDER — MEPERIDINE HYDROCHLORIDE 25 MG/ML
6.25 INJECTION INTRAMUSCULAR; INTRAVENOUS; SUBCUTANEOUS
Status: DISCONTINUED | OUTPATIENT
Start: 2021-09-15 | End: 2021-09-15 | Stop reason: HOSPADM

## 2021-09-15 RX ORDER — HYDROMORPHONE HYDROCHLORIDE 1 MG/ML
0.5 INJECTION, SOLUTION INTRAMUSCULAR; INTRAVENOUS; SUBCUTANEOUS
Status: DISCONTINUED | OUTPATIENT
Start: 2021-09-15 | End: 2021-09-16 | Stop reason: HOSPADM

## 2021-09-15 RX ORDER — DIPHENHYDRAMINE HYDROCHLORIDE 50 MG/ML
12.5 INJECTION INTRAMUSCULAR; INTRAVENOUS EVERY 6 HOURS PRN
Status: DISCONTINUED | OUTPATIENT
Start: 2021-09-15 | End: 2021-09-16 | Stop reason: HOSPADM

## 2021-09-15 RX ORDER — HALOPERIDOL 5 MG/ML
1 INJECTION INTRAMUSCULAR
Status: DISCONTINUED | OUTPATIENT
Start: 2021-09-15 | End: 2021-09-15 | Stop reason: HOSPADM

## 2021-09-15 RX ORDER — SODIUM CHLORIDE, SODIUM LACTATE, POTASSIUM CHLORIDE, CALCIUM CHLORIDE 600; 310; 30; 20 MG/100ML; MG/100ML; MG/100ML; MG/100ML
INJECTION, SOLUTION INTRAVENOUS CONTINUOUS
Status: DISCONTINUED | OUTPATIENT
Start: 2021-09-15 | End: 2021-09-15 | Stop reason: HOSPADM

## 2021-09-15 RX ORDER — HYDROMORPHONE HYDROCHLORIDE 1 MG/ML
0.4 INJECTION, SOLUTION INTRAMUSCULAR; INTRAVENOUS; SUBCUTANEOUS
Status: DISCONTINUED | OUTPATIENT
Start: 2021-09-15 | End: 2021-09-15 | Stop reason: HOSPADM

## 2021-09-15 RX ORDER — ONDANSETRON 2 MG/ML
4 INJECTION INTRAMUSCULAR; INTRAVENOUS EVERY 4 HOURS PRN
Status: DISCONTINUED | OUTPATIENT
Start: 2021-09-15 | End: 2021-09-16 | Stop reason: HOSPADM

## 2021-09-15 RX ORDER — PROMETHAZINE HYDROCHLORIDE 25 MG/1
25 SUPPOSITORY RECTAL EVERY 4 HOURS PRN
Status: DISCONTINUED | OUTPATIENT
Start: 2021-09-15 | End: 2021-09-16

## 2021-09-15 RX ORDER — CALCIUM CARBONATE 500 MG/1
500 TABLET, CHEWABLE ORAL
Status: DISCONTINUED | OUTPATIENT
Start: 2021-09-15 | End: 2021-09-16 | Stop reason: HOSPADM

## 2021-09-15 RX ORDER — ROCURONIUM BROMIDE 10 MG/ML
INJECTION, SOLUTION INTRAVENOUS PRN
Status: DISCONTINUED | OUTPATIENT
Start: 2021-09-15 | End: 2021-09-15 | Stop reason: SURG

## 2021-09-15 RX ORDER — SODIUM CHLORIDE, SODIUM LACTATE, POTASSIUM CHLORIDE, CALCIUM CHLORIDE 600; 310; 30; 20 MG/100ML; MG/100ML; MG/100ML; MG/100ML
INJECTION, SOLUTION INTRAVENOUS CONTINUOUS
Status: ACTIVE | OUTPATIENT
Start: 2021-09-15 | End: 2021-09-15

## 2021-09-15 RX ORDER — ONDANSETRON 2 MG/ML
INJECTION INTRAMUSCULAR; INTRAVENOUS PRN
Status: DISCONTINUED | OUTPATIENT
Start: 2021-09-15 | End: 2021-09-15 | Stop reason: SURG

## 2021-09-15 RX ORDER — ACETAMINOPHEN 500 MG
1000 TABLET ORAL EVERY 6 HOURS
Status: DISCONTINUED | OUTPATIENT
Start: 2021-09-16 | End: 2021-09-16 | Stop reason: HOSPADM

## 2021-09-15 RX ORDER — ENALAPRILAT 1.25 MG/ML
2.5 INJECTION INTRAVENOUS EVERY 6 HOURS PRN
Status: DISCONTINUED | OUTPATIENT
Start: 2021-09-15 | End: 2021-09-16 | Stop reason: HOSPADM

## 2021-09-15 RX ORDER — ACETAMINOPHEN 500 MG
1000 TABLET ORAL EVERY 6 HOURS PRN
Status: DISCONTINUED | OUTPATIENT
Start: 2021-09-20 | End: 2021-09-16 | Stop reason: HOSPADM

## 2021-09-15 RX ORDER — HYDROMORPHONE HYDROCHLORIDE 1 MG/ML
0.1 INJECTION, SOLUTION INTRAMUSCULAR; INTRAVENOUS; SUBCUTANEOUS
Status: DISCONTINUED | OUTPATIENT
Start: 2021-09-15 | End: 2021-09-15 | Stop reason: HOSPADM

## 2021-09-15 RX ORDER — ACETAMINOPHEN 500 MG
500-1000 TABLET ORAL EVERY 6 HOURS PRN
COMMUNITY
End: 2023-07-01

## 2021-09-15 RX ORDER — GABAPENTIN 300 MG/1
300 CAPSULE ORAL ONCE
Status: COMPLETED | OUTPATIENT
Start: 2021-09-15 | End: 2021-09-15

## 2021-09-15 RX ORDER — GABAPENTIN 300 MG/1
300 CAPSULE ORAL 3 TIMES DAILY
Status: DISCONTINUED | OUTPATIENT
Start: 2021-09-15 | End: 2021-09-16 | Stop reason: HOSPADM

## 2021-09-15 RX ORDER — LABETALOL HYDROCHLORIDE 5 MG/ML
5 INJECTION, SOLUTION INTRAVENOUS
Status: DISCONTINUED | OUTPATIENT
Start: 2021-09-15 | End: 2021-09-15 | Stop reason: HOSPADM

## 2021-09-15 RX ORDER — CEFAZOLIN SODIUM 1 G/3ML
INJECTION, POWDER, FOR SOLUTION INTRAMUSCULAR; INTRAVENOUS PRN
Status: DISCONTINUED | OUTPATIENT
Start: 2021-09-15 | End: 2021-09-15 | Stop reason: SURG

## 2021-09-15 RX ORDER — DEXAMETHASONE SODIUM PHOSPHATE 4 MG/ML
INJECTION, SOLUTION INTRA-ARTICULAR; INTRALESIONAL; INTRAMUSCULAR; INTRAVENOUS; SOFT TISSUE PRN
Status: DISCONTINUED | OUTPATIENT
Start: 2021-09-15 | End: 2021-09-15 | Stop reason: SURG

## 2021-09-15 RX ORDER — ACETAMINOPHEN 10 MG/ML
1000 INJECTION, SOLUTION INTRAVENOUS EVERY 6 HOURS
Status: COMPLETED | OUTPATIENT
Start: 2021-09-15 | End: 2021-09-16

## 2021-09-15 RX ORDER — HALOPERIDOL 5 MG/ML
1 INJECTION INTRAMUSCULAR EVERY 6 HOURS PRN
Status: DISCONTINUED | OUTPATIENT
Start: 2021-09-15 | End: 2021-09-16

## 2021-09-15 RX ORDER — DIPHENHYDRAMINE HYDROCHLORIDE 50 MG/ML
12.5 INJECTION INTRAMUSCULAR; INTRAVENOUS
Status: DISCONTINUED | OUTPATIENT
Start: 2021-09-15 | End: 2021-09-15 | Stop reason: HOSPADM

## 2021-09-15 RX ORDER — SIMETHICONE 80 MG
80 TABLET,CHEWABLE ORAL 3 TIMES DAILY PRN
Status: DISCONTINUED | OUTPATIENT
Start: 2021-09-15 | End: 2021-09-16 | Stop reason: HOSPADM

## 2021-09-15 RX ORDER — OXYCODONE HCL 5 MG/5 ML
5 SOLUTION, ORAL ORAL
Status: DISCONTINUED | OUTPATIENT
Start: 2021-09-15 | End: 2021-09-16 | Stop reason: HOSPADM

## 2021-09-15 RX ORDER — LIDOCAINE HYDROCHLORIDE 40 MG/ML
SOLUTION TOPICAL PRN
Status: DISCONTINUED | OUTPATIENT
Start: 2021-09-15 | End: 2021-09-15 | Stop reason: SURG

## 2021-09-15 RX ORDER — OXYCODONE HCL 10 MG/1
10 TABLET, FILM COATED, EXTENDED RELEASE ORAL ONCE
Status: COMPLETED | OUTPATIENT
Start: 2021-09-15 | End: 2021-09-15

## 2021-09-15 RX ORDER — ONDANSETRON 2 MG/ML
4 INJECTION INTRAMUSCULAR; INTRAVENOUS
Status: DISCONTINUED | OUTPATIENT
Start: 2021-09-15 | End: 2021-09-15 | Stop reason: HOSPADM

## 2021-09-15 RX ORDER — PHENYLEPHRINE HCL IN 0.9% NACL 0.5 MG/5ML
SYRINGE (ML) INTRAVENOUS PRN
Status: DISCONTINUED | OUTPATIENT
Start: 2021-09-15 | End: 2021-09-15 | Stop reason: SURG

## 2021-09-15 RX ORDER — MIDAZOLAM HYDROCHLORIDE 1 MG/ML
INJECTION INTRAMUSCULAR; INTRAVENOUS PRN
Status: DISCONTINUED | OUTPATIENT
Start: 2021-09-15 | End: 2021-09-15 | Stop reason: SURG

## 2021-09-15 RX ORDER — HYDROMORPHONE HYDROCHLORIDE 1 MG/ML
0.2 INJECTION, SOLUTION INTRAMUSCULAR; INTRAVENOUS; SUBCUTANEOUS
Status: DISCONTINUED | OUTPATIENT
Start: 2021-09-15 | End: 2021-09-15 | Stop reason: HOSPADM

## 2021-09-15 RX ORDER — SODIUM CHLORIDE, SODIUM LACTATE, POTASSIUM CHLORIDE, AND CALCIUM CHLORIDE .6; .31; .03; .02 G/100ML; G/100ML; G/100ML; G/100ML
500 INJECTION, SOLUTION INTRAVENOUS
Status: DISCONTINUED | OUTPATIENT
Start: 2021-09-15 | End: 2021-09-16 | Stop reason: HOSPADM

## 2021-09-15 RX ADMIN — OXYCODONE HYDROCHLORIDE 10 MG: 5 SOLUTION ORAL at 22:49

## 2021-09-15 RX ADMIN — POTASSIUM CHLORIDE: 2 INJECTION, SOLUTION, CONCENTRATE INTRAVENOUS at 20:05

## 2021-09-15 RX ADMIN — HYDROMORPHONE HYDROCHLORIDE 0.4 MG: 1 INJECTION, SOLUTION INTRAMUSCULAR; INTRAVENOUS; SUBCUTANEOUS at 17:17

## 2021-09-15 RX ADMIN — PROPOFOL 200 MG: 10 INJECTION, EMULSION INTRAVENOUS at 15:39

## 2021-09-15 RX ADMIN — FENTANYL CITRATE 50 MCG: 50 INJECTION, SOLUTION INTRAMUSCULAR; INTRAVENOUS at 15:55

## 2021-09-15 RX ADMIN — HYDROMORPHONE HYDROCHLORIDE 0.4 MG: 1 INJECTION, SOLUTION INTRAMUSCULAR; INTRAVENOUS; SUBCUTANEOUS at 17:27

## 2021-09-15 RX ADMIN — GABAPENTIN 300 MG: 300 CAPSULE ORAL at 12:47

## 2021-09-15 RX ADMIN — HYDROMORPHONE HYDROCHLORIDE 0.2 MG: 1 INJECTION, SOLUTION INTRAMUSCULAR; INTRAVENOUS; SUBCUTANEOUS at 17:07

## 2021-09-15 RX ADMIN — ACETAMINOPHEN 1000 MG: 10 INJECTION, SOLUTION INTRAVENOUS at 19:57

## 2021-09-15 RX ADMIN — OXYCODONE HYDROCHLORIDE 10 MG: 5 SOLUTION ORAL at 19:49

## 2021-09-15 RX ADMIN — HYDROMORPHONE HYDROCHLORIDE 0.4 MG: 1 INJECTION, SOLUTION INTRAMUSCULAR; INTRAVENOUS; SUBCUTANEOUS at 16:57

## 2021-09-15 RX ADMIN — ONDANSETRON 4 MG: 2 INJECTION INTRAMUSCULAR; INTRAVENOUS at 19:52

## 2021-09-15 RX ADMIN — OXYCODONE HYDROCHLORIDE 10 MG: 10 TABLET, FILM COATED, EXTENDED RELEASE ORAL at 12:47

## 2021-09-15 RX ADMIN — HYDROMORPHONE HYDROCHLORIDE 0.2 MG: 1 INJECTION, SOLUTION INTRAMUSCULAR; INTRAVENOUS; SUBCUTANEOUS at 17:32

## 2021-09-15 RX ADMIN — FENTANYL CITRATE 50 MCG: 0.05 INJECTION, SOLUTION INTRAMUSCULAR; INTRAVENOUS at 18:21

## 2021-09-15 RX ADMIN — Medication 100 MCG: at 15:46

## 2021-09-15 RX ADMIN — FENTANYL CITRATE 50 MCG: 50 INJECTION, SOLUTION INTRAMUSCULAR; INTRAVENOUS at 15:50

## 2021-09-15 RX ADMIN — FAMOTIDINE 20 MG: 10 INJECTION INTRAVENOUS at 19:49

## 2021-09-15 RX ADMIN — SUGAMMADEX 200 MG: 100 INJECTION, SOLUTION INTRAVENOUS at 16:05

## 2021-09-15 RX ADMIN — GABAPENTIN 300 MG: 300 CAPSULE ORAL at 19:49

## 2021-09-15 RX ADMIN — FENTANYL CITRATE 50 MCG: 50 INJECTION, SOLUTION INTRAMUSCULAR; INTRAVENOUS at 15:49

## 2021-09-15 RX ADMIN — HYDROMORPHONE HYDROCHLORIDE 0.4 MG: 1 INJECTION, SOLUTION INTRAMUSCULAR; INTRAVENOUS; SUBCUTANEOUS at 16:51

## 2021-09-15 RX ADMIN — CEFAZOLIN 2 G: 330 INJECTION, POWDER, FOR SOLUTION INTRAMUSCULAR; INTRAVENOUS at 15:37

## 2021-09-15 RX ADMIN — SODIUM CHLORIDE, POTASSIUM CHLORIDE, SODIUM LACTATE AND CALCIUM CHLORIDE: 600; 310; 30; 20 INJECTION, SOLUTION INTRAVENOUS at 15:58

## 2021-09-15 RX ADMIN — DEXAMETHASONE SODIUM PHOSPHATE 8 MG: 4 INJECTION, SOLUTION INTRA-ARTICULAR; INTRALESIONAL; INTRAMUSCULAR; INTRAVENOUS; SOFT TISSUE at 15:43

## 2021-09-15 RX ADMIN — LIDOCAINE HYDROCHLORIDE 4 ML: 40 SOLUTION TOPICAL at 15:39

## 2021-09-15 RX ADMIN — FENTANYL CITRATE 100 MCG: 50 INJECTION, SOLUTION INTRAMUSCULAR; INTRAVENOUS at 15:36

## 2021-09-15 RX ADMIN — FENTANYL CITRATE 50 MCG: 0.05 INJECTION, SOLUTION INTRAMUSCULAR; INTRAVENOUS at 16:31

## 2021-09-15 RX ADMIN — ACETAMINOPHEN 1 G: 10 INJECTION, SOLUTION INTRAVENOUS at 13:02

## 2021-09-15 RX ADMIN — SODIUM CHLORIDE, POTASSIUM CHLORIDE, SODIUM LACTATE AND CALCIUM CHLORIDE: 600; 310; 30; 20 INJECTION, SOLUTION INTRAVENOUS at 13:02

## 2021-09-15 RX ADMIN — FENTANYL CITRATE 50 MCG: 0.05 INJECTION, SOLUTION INTRAMUSCULAR; INTRAVENOUS at 16:33

## 2021-09-15 RX ADMIN — MIDAZOLAM HYDROCHLORIDE 2 MG: 1 INJECTION, SOLUTION INTRAMUSCULAR; INTRAVENOUS at 15:34

## 2021-09-15 RX ADMIN — ONDANSETRON 4 MG: 2 INJECTION INTRAMUSCULAR; INTRAVENOUS at 16:05

## 2021-09-15 RX ADMIN — ROCURONIUM BROMIDE 70 MG: 10 INJECTION, SOLUTION INTRAVENOUS at 15:39

## 2021-09-15 ASSESSMENT — PAIN DESCRIPTION - PAIN TYPE
TYPE: SURGICAL PAIN
TYPE: ACUTE PAIN;SURGICAL PAIN
TYPE: SURGICAL PAIN
TYPE: ACUTE PAIN;SURGICAL PAIN
TYPE: SURGICAL PAIN
TYPE: SURGICAL PAIN
TYPE: ACUTE PAIN;SURGICAL PAIN
TYPE: SURGICAL PAIN

## 2021-09-15 ASSESSMENT — COGNITIVE AND FUNCTIONAL STATUS - GENERAL
WALKING IN HOSPITAL ROOM: A LITTLE
SUGGESTED CMS G CODE MODIFIER DAILY ACTIVITY: CJ
TOILETING: A LITTLE
DRESSING REGULAR LOWER BODY CLOTHING: A LITTLE
MOBILITY SCORE: 18
SUGGESTED CMS G CODE MODIFIER MOBILITY: CK
TURNING FROM BACK TO SIDE WHILE IN FLAT BAD: A LITTLE
CLIMB 3 TO 5 STEPS WITH RAILING: A LITTLE
DAILY ACTIVITIY SCORE: 21
STANDING UP FROM CHAIR USING ARMS: A LITTLE
MOVING TO AND FROM BED TO CHAIR: A LITTLE
HELP NEEDED FOR BATHING: A LITTLE
MOVING FROM LYING ON BACK TO SITTING ON SIDE OF FLAT BED: A LITTLE

## 2021-09-15 ASSESSMENT — FIBROSIS 4 INDEX: FIB4 SCORE: .4897959183673469388

## 2021-09-15 ASSESSMENT — PATIENT HEALTH QUESTIONNAIRE - PHQ9
1. LITTLE INTEREST OR PLEASURE IN DOING THINGS: NOT AT ALL
SUM OF ALL RESPONSES TO PHQ9 QUESTIONS 1 AND 2: 0
2. FEELING DOWN, DEPRESSED, IRRITABLE, OR HOPELESS: NOT AT ALL

## 2021-09-15 ASSESSMENT — LIFESTYLE VARIABLES
ON A TYPICAL DAY WHEN YOU DRINK ALCOHOL HOW MANY DRINKS DO YOU HAVE: 2
TOTAL SCORE: 0
EVER FELT BAD OR GUILTY ABOUT YOUR DRINKING: NO
ALCOHOL_USE: YES
HAVE PEOPLE ANNOYED YOU BY CRITICIZING YOUR DRINKING: NO
AVERAGE NUMBER OF DAYS PER WEEK YOU HAVE A DRINK CONTAINING ALCOHOL: 1
TOTAL SCORE: 0
CONSUMPTION TOTAL: NEGATIVE
HAVE YOU EVER FELT YOU SHOULD CUT DOWN ON YOUR DRINKING: NO
EVER HAD A DRINK FIRST THING IN THE MORNING TO STEADY YOUR NERVES TO GET RID OF A HANGOVER: NO
HOW MANY TIMES IN THE PAST YEAR HAVE YOU HAD 5 OR MORE DRINKS IN A DAY: 0
TOTAL SCORE: 0

## 2021-09-15 NOTE — ANESTHESIA PREPROCEDURE EVALUATION
Morbid obese    Relevant Problems   No relevant active problems   possible sleep apnea    Physical Exam    Airway   Mallampati: II  TM distance: >3 FB  Neck ROM: full       Cardiovascular - normal exam  Rhythm: regular  Rate: normal  (-) murmur     Dental - normal exam        Very poor dentition   Pulmonary - normal exam  Breath sounds clear to auscultation     Abdominal    Neurological - normal exam         Other findings: Specifically discussed risk of dental damage.           Anesthesia Plan    ASA 3   ASA physical status 3 criteria: morbid obesity - BMI greater than or equal to 40    Plan - general       Airway plan will be ETT          Induction: intravenous    Postoperative Plan: Postoperative administration of opioids is intended.    Pertinent diagnostic labs and testing reviewed    Informed Consent:    Anesthetic plan and risks discussed with patient.    Use of blood products discussed with: patient whom consented to blood products.

## 2021-09-15 NOTE — ANESTHESIA PROCEDURE NOTES
Airway    Date/Time: 9/15/2021 3:39 PM  Performed by: Hernan Barboza M.D.  Authorized by: Hernan Barboza M.D.     Location:  OR  Urgency:  Elective  Difficult Airway: No    Indications for Airway Management:  Anesthesia      Spontaneous Ventilation: absent    Sedation Level:  Deep  Preoxygenated: Yes    Patient Position:  Sniffing  Mask Difficulty Assessment:  2 - vent by mask + OA or adjuvant +/- NMBA  Final Airway Type:  Endotracheal airway  Final Endotracheal Airway:  ETT  Cuffed: Yes    Technique Used for Successful ETT Placement:  Direct laryngoscopy    Insertion Site:  Oral  Blade Type:  Ashvin  Laryngoscope Blade/Videolaryngoscope Blade Size:  3  ETT Size (mm):  7.0  Measured from:  Teeth  ETT to Teeth (cm):  21  Placement Verified by: auscultation and capnometry    Cormack-Lehane Classification:  Grade I - full view of glottis  Number of Attempts at Approach:  1   Atraumatic DLx1, careful not to dislodge loose lower tooth

## 2021-09-15 NOTE — ANESTHESIA TIME REPORT
Anesthesia Start and Stop Event Times     Date Time Event    9/15/2021 1353 Ready for Procedure     1532 Anesthesia Start     1620 Anesthesia Stop        Responsible Staff  09/15/21    Name Role Begin End    Hernan Barboza M.D. Anesth 1532 1620        Preop Diagnosis (Free Text):  Pre-op Diagnosis     MORBID OBESITY        Preop Diagnosis (Codes):    Post op Diagnosis  Morbidly obese (HCC)      Premium Reason  B. 1st Call    Comments:

## 2021-09-16 VITALS
SYSTOLIC BLOOD PRESSURE: 124 MMHG | TEMPERATURE: 97.9 F | WEIGHT: 241.18 LBS | OXYGEN SATURATION: 98 % | HEIGHT: 62 IN | DIASTOLIC BLOOD PRESSURE: 79 MMHG | BODY MASS INDEX: 44.38 KG/M2 | HEART RATE: 85 BPM | RESPIRATION RATE: 18 BRPM

## 2021-09-16 LAB
ALBUMIN SERPL BCP-MCNC: 4.2 G/DL (ref 3.2–4.9)
ALBUMIN/GLOB SERPL: 1.4 G/DL
ALP SERPL-CCNC: 80 U/L (ref 30–99)
ALT SERPL-CCNC: 25 U/L (ref 2–50)
ANION GAP SERPL CALC-SCNC: 13 MMOL/L (ref 7–16)
AST SERPL-CCNC: 27 U/L (ref 12–45)
BILIRUB SERPL-MCNC: 0.2 MG/DL (ref 0.1–1.5)
BUN SERPL-MCNC: 13 MG/DL (ref 8–22)
CALCIUM SERPL-MCNC: 9.2 MG/DL (ref 8.5–10.5)
CHLORIDE SERPL-SCNC: 102 MMOL/L (ref 96–112)
CO2 SERPL-SCNC: 21 MMOL/L (ref 20–33)
CREAT SERPL-MCNC: 0.78 MG/DL (ref 0.5–1.4)
ERYTHROCYTE [DISTWIDTH] IN BLOOD BY AUTOMATED COUNT: 48.6 FL (ref 35.9–50)
GLOBULIN SER CALC-MCNC: 3 G/DL (ref 1.9–3.5)
GLUCOSE SERPL-MCNC: 130 MG/DL (ref 65–99)
HCT VFR BLD AUTO: 43.7 % (ref 37–47)
HGB BLD-MCNC: 14.3 G/DL (ref 12–16)
MCH RBC QN AUTO: 29.7 PG (ref 27–33)
MCHC RBC AUTO-ENTMCNC: 32.7 G/DL (ref 33.6–35)
MCV RBC AUTO: 90.9 FL (ref 81.4–97.8)
PATHOLOGY CONSULT NOTE: NORMAL
PLATELET # BLD AUTO: 312 K/UL (ref 164–446)
PMV BLD AUTO: 11.1 FL (ref 9–12.9)
POTASSIUM SERPL-SCNC: 5.3 MMOL/L (ref 3.6–5.5)
PROT SERPL-MCNC: 7.2 G/DL (ref 6–8.2)
RBC # BLD AUTO: 4.81 M/UL (ref 4.2–5.4)
SODIUM SERPL-SCNC: 136 MMOL/L (ref 135–145)
WBC # BLD AUTO: 14.4 K/UL (ref 4.8–10.8)

## 2021-09-16 PROCEDURE — 85027 COMPLETE CBC AUTOMATED: CPT

## 2021-09-16 PROCEDURE — 700111 HCHG RX REV CODE 636 W/ 250 OVERRIDE (IP): Performed by: PHYSICIAN ASSISTANT

## 2021-09-16 PROCEDURE — 80053 COMPREHEN METABOLIC PANEL: CPT

## 2021-09-16 PROCEDURE — 700105 HCHG RX REV CODE 258: Performed by: PHYSICIAN ASSISTANT

## 2021-09-16 PROCEDURE — 700102 HCHG RX REV CODE 250 W/ 637 OVERRIDE(OP): Performed by: PHYSICIAN ASSISTANT

## 2021-09-16 PROCEDURE — A9270 NON-COVERED ITEM OR SERVICE: HCPCS | Performed by: PHYSICIAN ASSISTANT

## 2021-09-16 RX ORDER — PROCHLORPERAZINE EDISYLATE 5 MG/ML
10 INJECTION INTRAMUSCULAR; INTRAVENOUS EVERY 6 HOURS PRN
Status: DISCONTINUED | OUTPATIENT
Start: 2021-09-16 | End: 2021-09-16 | Stop reason: HOSPADM

## 2021-09-16 RX ADMIN — POTASSIUM CHLORIDE: 2 INJECTION, SOLUTION, CONCENTRATE INTRAVENOUS at 01:30

## 2021-09-16 RX ADMIN — HALOPERIDOL LACTATE 1 MG: 5 INJECTION, SOLUTION INTRAMUSCULAR at 07:41

## 2021-09-16 RX ADMIN — FAMOTIDINE 20 MG: 10 INJECTION INTRAVENOUS at 04:40

## 2021-09-16 RX ADMIN — ONDANSETRON 4 MG: 2 INJECTION INTRAMUSCULAR; INTRAVENOUS at 03:23

## 2021-09-16 RX ADMIN — ENOXAPARIN SODIUM 40 MG: 40 INJECTION SUBCUTANEOUS at 07:09

## 2021-09-16 RX ADMIN — DIPHENHYDRAMINE HYDROCHLORIDE 12.5 MG: 50 INJECTION INTRAMUSCULAR; INTRAVENOUS at 01:30

## 2021-09-16 RX ADMIN — ACETAMINOPHEN 1000 MG: 10 INJECTION, SOLUTION INTRAVENOUS at 00:58

## 2021-09-16 RX ADMIN — ACETAMINOPHEN 1000 MG: 500 TABLET ORAL at 12:03

## 2021-09-16 RX ADMIN — GABAPENTIN 300 MG: 300 CAPSULE ORAL at 12:04

## 2021-09-16 RX ADMIN — ACETAMINOPHEN 1000 MG: 500 TABLET ORAL at 08:56

## 2021-09-16 RX ADMIN — OXYCODONE HYDROCHLORIDE 10 MG: 5 SOLUTION ORAL at 04:40

## 2021-09-16 RX ADMIN — GABAPENTIN 300 MG: 300 CAPSULE ORAL at 04:40

## 2021-09-16 ASSESSMENT — ENCOUNTER SYMPTOMS
CHILLS: 0
ABDOMINAL PAIN: 1
COUGH: 0
VOMITING: 0
DIZZINESS: 0
FEVER: 0
NAUSEA: 1

## 2021-09-16 ASSESSMENT — COPD QUESTIONNAIRES
HAVE YOU SMOKED AT LEAST 100 CIGARETTES IN YOUR ENTIRE LIFE: NO/DON'T KNOW
DO YOU EVER COUGH UP ANY MUCUS OR PHLEGM?: NO/ONLY WITH OCCASIONAL COLDS OR INFECTIONS
DURING THE PAST 4 WEEKS HOW MUCH DID YOU FEEL SHORT OF BREATH: SOME OF THE TIME
COPD SCREENING SCORE: 2

## 2021-09-16 ASSESSMENT — PAIN DESCRIPTION - PAIN TYPE
TYPE: ACUTE PAIN
TYPE: ACUTE PAIN;SURGICAL PAIN
TYPE: ACUTE PAIN
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
## 2021-09-16 NOTE — CARE PLAN
Problem: Pain - Standard  Goal: Alleviation of pain or a reduction in pain to the patient’s comfort goal  Outcome: Progressing   Pt Denies Pain At this time  Problem: Knowledge Deficit - Standard  Goal: Patient and family/care givers will demonstrate understanding of plan of care, disease process/condition, diagnostic tests and medications  Outcome: Progressing   Pt educated by this Nurse Apprentice and Rn on surgical incision care/ activity post hospital stay   The patient is Stable - Low risk of patient condition declining or worsening    Shift Goals  Clinical Goals: (P) Ambulate/Discharge  Patient Goals: (P) Ambulate/Discharge  Family Goals: (P) N/A    Progress made toward(s) clinical / shift goals:  Pt Ambulating self, no assistance Required     Patient is not progressing towards the following goals:

## 2021-09-16 NOTE — PROGRESS NOTES
Patient meeting discharge criteria. Nausea resolved, tolerating liquids. Pain controlled on PO. Ambulating independently, Room air. MD notified for discharge orders. Discharge lounge order placed.

## 2021-09-16 NOTE — PROGRESS NOTES
Pt in no acute distress no SOB. Discharge instruction information reviewed with pt. All questions answered. PIV removed. Prescriptions filled by family. Pt escorted out via wheelchair.

## 2021-09-16 NOTE — ANESTHESIA POSTPROCEDURE EVALUATION
Patient: Johanne Raya    Procedure Summary     Date: 09/15/21 Room / Location: [place] [address] / SURGERY [facility]    Anesthesia Start: 1532 Anesthesia Stop: 1620    Procedure: GASTRECTOMY, SLEEVE, LAPAROSCOPIC (Abdomen) Diagnosis: (MORBID OBESITY)    Surgeons: Regulo Hugo M.D. Responsible Provider: Hernan Barboza M.D.    Anesthesia Type: general ASA Status: 3          Final Anesthesia Type: general  Last vitals  BP   Blood Pressure: 132/63    Temp   36.6 °C (97.8 °F)    Pulse   62   Resp   20    SpO2   95 %      Anesthesia Post Evaluation    Patient location during evaluation: PACU  Patient participation: complete - patient participated  Level of consciousness: awake and alert    Airway patency: patent  Anesthetic complications: no  Cardiovascular status: hemodynamically stable  Respiratory status: acceptable  Hydration status: euvolemic    PONV: none          No complications documented.     Nurse Pain Score: 0 (NPRS)

## 2021-09-16 NOTE — PROGRESS NOTES
Bedside report received.  Assessment complete.  A&O x 4. Patient calls appropriately.  Patient ambulates with SBA.  Patient has 6/10 pain. Pain managed with prescribed medications.  Denies N&V. Tolerating ice chips and sips then clear liquid diet in AM.  x4 lap sites to abd, x1 with old dry drainage.  - void (has not voided on floor yet), - flatus, last BM PTA.  Patient denies SOB.  Review plan with of care with patient. Call light and personal belongings with in reach. Hourly rounding in place. All needs met at this time.

## 2021-09-16 NOTE — PROGRESS NOTES
Assumed care of patient at 0645. Bedside report received. Assessment complete.  AA&Ox4. Denies CP/SOB.   Pt reporting Nausea, Medicated Per Mar   Pt Denies Pain at this time  Educated patient regarding pharmacologic and non pharmacologic modalities for pain management.  Skin per flowsheets x4 Lap Sites Observed Serosanguinous Drainage Noted   Tolerating Cardiac diet. Denies N/V.  + void. Last BM PTA   Pt ambulates self no assistance required  All needs met at this time. Call light within reach. Pt calls appropriately. Bed low and locked, non skid socks in place. Hourly rounding in place.

## 2021-09-16 NOTE — DIETARY
NUTRITION SERVICES: BMI - Pt with BMI >40 (=Body mass index is 44.11 kg/m².), morbid obesity. Weight loss counseling not appropriate in acute care setting. Pt s/p lap sleeve gastrectomy, anticipate f/u planned with MD/RD post d/c.     RECOMMEND - Referral to outpatient nutrition services for weight management, or F/u with MD/RD after D/C.

## 2021-09-16 NOTE — OR NURSING
Instructed on use of incentive spirometer; volumes 2000 ml   Tolerating ice chips well; no complaints of nausea  Medicated for complaint of pain with partial relief (5/10 on 10 scale)  Patient on room air  Transferred to room via gurney  Pertinent post op orders reviewed with receiving RN

## 2021-09-16 NOTE — CARE PLAN
The patient is Stable - Low risk of patient condition declining or worsening    Shift Goals  Clinical Goals: pain control, void, ambulate, no N/V  Patient Goals: rest and pain control    Progress made toward(s) clinical / shift goals:  Pt is resting comfortably in bed. Pain is being controlled with PRN and scheduled medications. Pt is able to utilize 0-10 pain scale. Pt denies N/V at this time. Plan to ambulate the [facility].    Patient is not progressing towards the following goals:

## 2021-09-16 NOTE — DISCHARGE PLANNING
Anticipated Discharge Disposition: Home with close outpatient follow-up     Action: Patient admitted for Laparoscopic Sleeve Gastrectomy     Anticipate the patient will discharge home. There are no known needs from case management at this time; please consult as needs arise.      Barriers to Discharge: Medical Clearance     Plan: Follow up with medical team.

## 2021-09-16 NOTE — PROGRESS NOTES
Surgical Progress Note    Author: Alexandro Berman P.A.-C. Date & Time created: 2021   8:15 AM     Interval Events:    Patient is a pleasant 40 y/o F POD1 s/p gastric sleeve. Tolerating clears without nausea/vomiting. Admits to typical incisional abdominal pain, somewhat controlled with medication. Pt is ambulating and voiding. Labs reviewed, VSS.     Review of Systems   Constitutional: Negative for chills and fever.   Respiratory: Negative for cough.    Cardiovascular: Negative for chest pain.   Gastrointestinal: Positive for abdominal pain and nausea. Negative for vomiting.   Skin: Negative for rash.   Neurological: Negative for dizziness.     Hemodynamics:  Temp (24hrs), Av.5 °C (97.7 °F), Min:36.1 °C (97 °F), Max:36.9 °C (98.4 °F)  Temperature: 36.7 °C (98.1 °F)  Pulse  Av.6  Min: 55  Max: 98   Blood Pressure: 118/69     Respiratory:    Respiration: 18, Pulse Oximetry: 94 %           Neuro:  GCS       Fluids:    Intake/Output Summary (Last 24 hours) at 2021 0815  Last data filed at 2021 0400  Gross per 24 hour   Intake 3187.5 ml   Output 375 ml   Net 2812.5 ml     Weight: 109 kg (241 lb 2.9 oz)  Current Diet Order   Procedures   • Diet Order Diet: Clear Liquid (Sips with meds and ice chips okay on the day of surgery. ); Miscellaneous modifications: (optional): Bariatric     Physical Exam  Constitutional:       Appearance: Normal appearance. She is obese.   HENT:      Head: Normocephalic and atraumatic.      Mouth/Throat:      Mouth: Mucous membranes are moist.   Eyes:      Extraocular Movements: Extraocular movements intact.   Cardiovascular:      Rate and Rhythm: Normal rate and regular rhythm.   Pulmonary:      Effort: Pulmonary effort is normal.   Abdominal:      Palpations: Abdomen is soft. There is no mass.      Tenderness: There is abdominal tenderness.      Hernia: No hernia is present.   Musculoskeletal:      Cervical back: Normal range of motion.   Skin:     General: Skin is  warm and dry.   Neurological:      General: No focal deficit present.      Mental Status: She is alert.       Labs:  Recent Results (from the past 24 hour(s))   HCG Qualitative Ur    Collection Time: 09/15/21  2:15 PM   Result Value Ref Range    Beta-Hcg Urine Negative Negative   CBC without Differential (blood)    Collection Time: 09/16/21  1:33 AM   Result Value Ref Range    WBC 14.4 (H) 4.8 - 10.8 K/uL    RBC 4.81 4.20 - 5.40 M/uL    Hemoglobin 14.3 12.0 - 16.0 g/dL    Hematocrit 43.7 37.0 - 47.0 %    MCV 90.9 81.4 - 97.8 fL    MCH 29.7 27.0 - 33.0 pg    MCHC 32.7 (L) 33.6 - 35.0 g/dL    RDW 48.6 35.9 - 50.0 fL    Platelet Count 312 164 - 446 K/uL    MPV 11.1 9.0 - 12.9 fL   Comp Metabolic Panel (CMP)    Collection Time: 09/16/21  1:33 AM   Result Value Ref Range    Sodium 136 135 - 145 mmol/L    Potassium 5.3 3.6 - 5.5 mmol/L    Chloride 102 96 - 112 mmol/L    Co2 21 20 - 33 mmol/L    Anion Gap 13.0 7.0 - 16.0    Glucose 130 (H) 65 - 99 mg/dL    Bun 13 8 - 22 mg/dL    Creatinine 0.78 0.50 - 1.40 mg/dL    Calcium 9.2 8.5 - 10.5 mg/dL    AST(SGOT) 27 12 - 45 U/L    ALT(SGPT) 25 2 - 50 U/L    Alkaline Phosphatase 80 30 - 99 U/L    Total Bilirubin 0.2 0.1 - 1.5 mg/dL    Albumin 4.2 3.2 - 4.9 g/dL    Total Protein 7.2 6.0 - 8.2 g/dL    Globulin 3.0 1.9 - 3.5 g/dL    A-G Ratio 1.4 g/dL   ESTIMATED GFR    Collection Time: 09/16/21  1:33 AM   Result Value Ref Range    GFR If African American >60 >60 mL/min/1.73 m 2    GFR If Non African American >60 >60 mL/min/1.73 m 2     Medical Decision Making, by Problem:  There are no active hospital problems to display for this patient.    Plan:    Pt is alert and oriented, NAD. Breathing unlabored. Tolerating PO with some nausea, will add compazine.  Incisions ok will place abdominal binder this AM. VS stable. Labs reviewed.  Leukocytosis, likely reactive. Encouraged ambulation and incentive spirometry.  Wean O2. Pt may need to stay another night for nausea, pain control,  hypoxia, will see how the day progresses. Pt also seen and examined by Dr. Hugo.    Quality Measures:  Quality-Core Measures   Reviewed items::  Labs reviewed  Jules catheter::  No Jules  DVT prophylaxis pharmacological::  Enoxaparin (Lovenox)  DVT prophylaxis - mechanical:  SCDs  Ulcer Prophylaxis::  Yes      Discussed patient condition with Patient and Dr. Hugo

## 2021-09-16 NOTE — DISCHARGE INSTRUCTIONS
Discharge Instructions    Discharged to home by car with relative. Discharged via wheelchair, hospital escort: Yes.  Special equipment needed: Not Applicable    Be sure to schedule a follow-up appointment with your primary care doctor or any specialists as instructed.     Discharge Plan:   Diet Plan: Discussed  Activity Level: Discussed  Confirmed Follow up Appointment: Patient to Call and Schedule Appointment  Confirmed Symptoms Management: Discussed  Medication Reconciliation Updated: Yes    I understand that a diet low in cholesterol, fat, and sodium is recommended for good health. Unless I have been given specific instructions below for another diet, I accept this instruction as my diet prescription.   Other diet: Follow Oanh's specific diet instructions POST Sleeve.     Special Instructions: None    · Is patient discharged on Warfarin / Coumadin?   No     Depression / Suicide Risk    As you are discharged from this [facility] facility, it is important to learn how to keep safe from harming yourself.    Recognize the warning signs:  · Abrupt changes in personality, positive or negative- including increase in energy   · Giving away possessions  · Change in eating patterns- significant weight changes-  positive or negative  · Change in sleeping patterns- unable to sleep or sleeping all the time   · Unwillingness or inability to communicate  · Depression  · Unusual sadness, discouragement and loneliness  · Talk of wanting to die  · Neglect of personal appearance   · Rebelliousness- reckless behavior  · Withdrawal from people/activities they love  · Confusion- inability to concentrate     If you or a loved one observes any of these behaviors or has concerns about self-harm, here's what you can do:  · Talk about it- your feelings and reasons for harming yourself  · Remove any means that you might use to hurt yourself (examples: pills, rope, extension cords, firearm)  · Get professional help from the community  (Mental Health, Substance Abuse, psychological counseling)  · Do not be alone:Call your Safe Contact- someone whom you trust who will be there for you.  · Call your local CRISIS HOTLINE 480-4758 or 561-842-7528  · Call your local Children's Mobile Crisis Response Team [place] (546) 415-2474 or www.Appsee  · Call the toll free National Suicide Prevention Hotlines   · National Suicide Prevention Lifeline 569-703-PEPN (3806)  · National Hope Line Network 800-SUICIDE (790-3805)    Bariatric D/C instructions: 1. DIET: Follow the diet progression detailed in your post-op booklet.  Progressing as instructed will make for a smooth transition after surgery and prevent any pain associated with eating foods before your stomach is ready or eating too much.  Water and hydration is much more important than food intake the first week or two after surgery.  Drink enough water to keep your urine pale yellow.  Increase water intake if your urine is a darker yellow or if have burning with urination.  Nausea and vomiting once or twice after you leave the hospital is normal.  Sip fluids continually and stay hydrated. 2.  SUPPLEMENTS: Start your supplements 1 week after surgery.  You may need to cut some supplements in half initially.  Follow the guidelines from your supplement handout from your pre-op class. 3. ACTIVITIES: After discharge from the hospital, you may resume full routine activities. However, there should be no heavy lifting (greater than 15 pounds) and no strenuous activities until after your follow-up visit. Otherwise, routine activities of daily living are acceptable.  More movement and walking after surgery the better. 4. DRIVING: You may drive whenever you are off pain medications and are able to perform the activities needed to drive, i.e. turning, bending, twisting, etc. 5. BATHING AND WOUND CARE: You may get the wound wet 2 days after surgery. You may shower, but do not submerge in a bath for at  least a week. Dressings may come off after 48 hours. Please leave the steri-strips in place, they will fall off over 5-7 days. You may notice some clear or slightly bloody drainage from your wounds and there may be some mild redness or bruising around your incision sites.  This is normal. 6. BOWEL FUNCTION: Constipation is common after an operation, especially with pain medications. The combination of pain medication and decreased activity level can cause constipation in otherwise normal patients. If you feel this is occurring, take a laxative (Milk of Magnesia, Miralax, etc.) until the problem has resolved.  Diarrhea the first few days after surgery can also be normal.  You may notice that it is dark in color or see blood, which is also normal and should subside in the first week post-op. 7. PAIN MEDICATION: You have been given a prescription for pain medication preoperatively.  Please take these as directed. It is important to remember not to take medications on an empty stomach as this may cause nausea.  For minimal discomfort you may use liquid Tylenol 650 mg every 4 hours.  DO NOT exceed 4,000 mg of Tylenol in 24 hours.  DO NOT use non steroidal anti-inflamatory medication such as: Asprin, Ibuprofen, Advil, Motrin, Aleve, or steroids.  These medication can cause ulcers after weight loss surgery. 8.CALL IF YOU HAVE: (1) Fevers to more than 101.5 F, (2) leg pain or swelling (3) Drainage or fluid from incision that may be foul smelling, increased tenderness or soreness at the wound or the wound edges are no longer together, redness or swelling at the incision site. (4) Night Sweats (5) Shaking, Chills (6) Persistent Nausea or Vomiting for over 24 hours.  Use your anti nausea medication as prescribed. (7) Call 911 if sudden onset of chest pain or shortness of breath that does not improve with 5-10 min of rest. 9. APPOINTMENT: Contact our office at [phone] for a follow-up appointment in 1-2 weeks following your  procedure.  Our office hours are Monday-Friday, 8am-5pm.  Please try to call during these hours when we are better able to assist you. If you have any additional questions, please do not hesitate to call the office and speak to either myself or the physician on call. Office address: [facility]. [address] [place] [address] [place] [address]

## 2021-09-29 NOTE — OP REPORT
NAME:  Johanne Raya  MRN:  [id number]  :  1980      DATE OF OPERATION: 2021    PREOPERATIVE DIAGNOSIS: Morbid Obesity with medical sequelae    POSTOPERATIVE DIAGNOSIS: Morbid Obesity with medical sequelae    OPERATION PERFORMED: 1.  Laparoscopic Sleeve Gastrectomy    SURGEON: [facility] MD    ASSISTANT:  Mignon Mcdermott PA-C, PA-C    ANESTHESIOLOGIST:  Anesthesiologist: Hernan Barboza M.D.    ANESTHESIA: General endotracheal anesthesia.     SPECIMEN: Stomach    ESTIMATED BLOOD LOSS: <10cc.     INDICATIONS: The patient is a 41 y.o. female with a diagnosis of morbid obesity with medical sequelae. She is taken to the operating room today for Laparoscopic Sleeve Gastrectomy.     PROCEDURE: Following informed consent, the patient was properly identified, taken to the operating room, and placed in the supine position where general endotracheal anesthesia was administered. Intravenous antibiotics were administered by the anesthesiologist in the correct time interval. Sequential compression devices were employed. The abdomen was prepped and draped into a sterile field.     An optical entry bladeless  trocar was utilized and pneumoperitoneum carefully established in the usual fashion.  The bladeless 5 mm separator trocar was introduced and the 5 mm lens/camera was passed into the peritoneal cavity.  Three additional separator trocars were placed under direct vision.  A 5 mm Scottie-type liver retractor was placed into position.  This was used to elevate the left sided segment of the liver.  It was secured to the patients right side with a robot arm.  Careful inspection revealed no untoward events with placement of the trocars.    The gastrocolic omentum was examined and dissected with the ligasure device and a point on the distal antrum was selected to begin the sleeve gastrectomy.  A 40 Niuean bougie was then passed down into the antrum.  A careful inspection at the hiatus demonstrated no  significant hiatal hernia which would require repair or risk significant reflux. A echelon linear stapler with a thick-tissue cartridges, was employed to divide partway across the stomach.  With the bougie in position, the stapler was then used to march proximally along the stomach and transsection of the stomach was performed, beginning 5 cm proximal to the pylorus in the method of the sleeve gastrectomy.  The greater curvature aspect of the stomach was then dissected and the greater curvature vessels and short gastric vessels were divided with the ligasure.      The last endomechanical stapler firings were used to complete the transection of the stomach.  Hemoclips were used if any site exhibited oozing. Careful inspection of the staple line demonstrated excellent, meticulous hemostasis and a completely intact staple line with seemless tissue approximation.  Seromuscular sutures were placed using polysorb suture to further secure the staple line.  The liver exhibited mild hepatic steatosis.  The bougie was then removed.     The large endocatch bag was then used to retrieve the stomach specimen.  Tisseal fibrin glue sealant was sprayed along the entire staple line. The ports were removed under direct vision and the pneumoperitoneum was allowed to escape. The fascia of this port was closed with 0-Vicryl suture.  The port sites were then irrigated well.  The port site skin incisions were closed with interrupted 4-0 Vicryl subcuticular sutures.  Steri-Strips and Benzoin were applied beneath sterile Band-Aids.     The patient tolerated the procedure well and there were no apparent complications. All sponge, needle, and instrument counts were correct on 2 separate occasions. She was awakened, extubated, and transferred to the recovery room in satisfactory condition.       ____________________________________   [facility] MD  DD: 9/29/2021  9:11 AM    CC:  [facility];

## 2022-07-15 ENCOUNTER — HOSPITAL ENCOUNTER (EMERGENCY)
Facility: MEDICAL CENTER | Age: 42
End: 2022-07-16
Attending: EMERGENCY MEDICINE
Payer: COMMERCIAL

## 2022-07-15 DIAGNOSIS — T50.902A INTENTIONAL DRUG OVERDOSE, INITIAL ENCOUNTER (HCC): ICD-10-CM

## 2022-07-15 DIAGNOSIS — R45.851 SUICIDAL IDEATION: ICD-10-CM

## 2022-07-15 LAB
ALBUMIN SERPL BCP-MCNC: 3.9 G/DL (ref 3.2–4.9)
ALBUMIN/GLOB SERPL: 1.6 G/DL
ALP SERPL-CCNC: 66 U/L (ref 30–99)
ALT SERPL-CCNC: 30 U/L (ref 2–50)
AMPHET UR QL SCN: NEGATIVE
ANION GAP SERPL CALC-SCNC: 13 MMOL/L (ref 7–16)
APAP SERPL-MCNC: 14 UG/ML (ref 10–30)
AST SERPL-CCNC: 17 U/L (ref 12–45)
BARBITURATES UR QL SCN: NEGATIVE
BASOPHILS # BLD AUTO: 0.7 % (ref 0–1.8)
BASOPHILS # BLD: 0.07 K/UL (ref 0–0.12)
BENZODIAZ UR QL SCN: NEGATIVE
BILIRUB SERPL-MCNC: 0.2 MG/DL (ref 0.1–1.5)
BUN SERPL-MCNC: 18 MG/DL (ref 8–22)
BZE UR QL SCN: NEGATIVE
CALCIUM SERPL-MCNC: 8.6 MG/DL (ref 8.5–10.5)
CANNABINOIDS UR QL SCN: NEGATIVE
CHLORIDE SERPL-SCNC: 111 MMOL/L (ref 96–112)
CO2 SERPL-SCNC: 19 MMOL/L (ref 20–33)
CREAT SERPL-MCNC: 0.65 MG/DL (ref 0.5–1.4)
EKG IMPRESSION: NORMAL
EOSINOPHIL # BLD AUTO: 0.17 K/UL (ref 0–0.51)
EOSINOPHIL NFR BLD: 1.6 % (ref 0–6.9)
ERYTHROCYTE [DISTWIDTH] IN BLOOD BY AUTOMATED COUNT: 48.8 FL (ref 35.9–50)
ETHANOL BLD-MCNC: 250.3 MG/DL
GFR SERPLBLD CREATININE-BSD FMLA CKD-EPI: 112 ML/MIN/1.73 M 2
GLOBULIN SER CALC-MCNC: 2.5 G/DL (ref 1.9–3.5)
GLUCOSE SERPL-MCNC: 128 MG/DL (ref 65–99)
HCG UR QL: NEGATIVE
HCT VFR BLD AUTO: 39.9 % (ref 37–47)
HGB BLD-MCNC: 13.9 G/DL (ref 12–16)
IMM GRANULOCYTES # BLD AUTO: 0.06 K/UL (ref 0–0.11)
IMM GRANULOCYTES NFR BLD AUTO: 0.6 % (ref 0–0.9)
LIPASE SERPL-CCNC: 103 U/L (ref 11–82)
LYMPHOCYTES # BLD AUTO: 3.52 K/UL (ref 1–4.8)
LYMPHOCYTES NFR BLD: 33.4 % (ref 22–41)
MCH RBC QN AUTO: 32.8 PG (ref 27–33)
MCHC RBC AUTO-ENTMCNC: 34.8 G/DL (ref 33.6–35)
MCV RBC AUTO: 94.1 FL (ref 81.4–97.8)
METHADONE UR QL SCN: NEGATIVE
MONOCYTES # BLD AUTO: 0.45 K/UL (ref 0–0.85)
MONOCYTES NFR BLD AUTO: 4.3 % (ref 0–13.4)
NEUTROPHILS # BLD AUTO: 6.28 K/UL (ref 2–7.15)
NEUTROPHILS NFR BLD: 59.4 % (ref 44–72)
NRBC # BLD AUTO: 0 K/UL
NRBC BLD-RTO: 0 /100 WBC
OPIATES UR QL SCN: POSITIVE
OXYCODONE UR QL SCN: NEGATIVE
PCP UR QL SCN: NEGATIVE
PLATELET # BLD AUTO: 470 K/UL (ref 164–446)
PMV BLD AUTO: 9.7 FL (ref 9–12.9)
POTASSIUM SERPL-SCNC: 3.4 MMOL/L (ref 3.6–5.5)
PROPOXYPH UR QL SCN: NEGATIVE
PROT SERPL-MCNC: 6.4 G/DL (ref 6–8.2)
RBC # BLD AUTO: 4.24 M/UL (ref 4.2–5.4)
SALICYLATES SERPL-MCNC: <1 MG/DL (ref 15–25)
SODIUM SERPL-SCNC: 143 MMOL/L (ref 135–145)
WBC # BLD AUTO: 10.6 K/UL (ref 4.8–10.8)

## 2022-07-15 PROCEDURE — 96374 THER/PROPH/DIAG INJ IV PUSH: CPT

## 2022-07-15 PROCEDURE — 81025 URINE PREGNANCY TEST: CPT

## 2022-07-15 PROCEDURE — A9270 NON-COVERED ITEM OR SERVICE: HCPCS | Performed by: EMERGENCY MEDICINE

## 2022-07-15 PROCEDURE — 99285 EMERGENCY DEPT VISIT HI MDM: CPT

## 2022-07-15 PROCEDURE — 80053 COMPREHEN METABOLIC PANEL: CPT

## 2022-07-15 PROCEDURE — 36415 COLL VENOUS BLD VENIPUNCTURE: CPT

## 2022-07-15 PROCEDURE — 83690 ASSAY OF LIPASE: CPT

## 2022-07-15 PROCEDURE — 82077 ASSAY SPEC XCP UR&BREATH IA: CPT

## 2022-07-15 PROCEDURE — 80143 DRUG ASSAY ACETAMINOPHEN: CPT

## 2022-07-15 PROCEDURE — 93005 ELECTROCARDIOGRAM TRACING: CPT | Performed by: EMERGENCY MEDICINE

## 2022-07-15 PROCEDURE — 700111 HCHG RX REV CODE 636 W/ 250 OVERRIDE (IP): Performed by: EMERGENCY MEDICINE

## 2022-07-15 PROCEDURE — 700102 HCHG RX REV CODE 250 W/ 637 OVERRIDE(OP): Performed by: EMERGENCY MEDICINE

## 2022-07-15 PROCEDURE — 80179 DRUG ASSAY SALICYLATE: CPT

## 2022-07-15 PROCEDURE — 80307 DRUG TEST PRSMV CHEM ANLYZR: CPT

## 2022-07-15 PROCEDURE — 85025 COMPLETE CBC W/AUTO DIFF WBC: CPT

## 2022-07-15 RX ORDER — NICOTINE 21 MG/24HR
1 PATCH, TRANSDERMAL 24 HOURS TRANSDERMAL ONCE
Status: DISCONTINUED | OUTPATIENT
Start: 2022-07-16 | End: 2022-07-16 | Stop reason: HOSPADM

## 2022-07-15 RX ORDER — ONDANSETRON 2 MG/ML
4 INJECTION INTRAMUSCULAR; INTRAVENOUS ONCE
Status: COMPLETED | OUTPATIENT
Start: 2022-07-15 | End: 2022-07-15

## 2022-07-15 RX ADMIN — NICOTINE TRANSDERMAL SYSTEM 21 MG: 21 PATCH, EXTENDED RELEASE TRANSDERMAL at 23:46

## 2022-07-15 RX ADMIN — ONDANSETRON 4 MG: 2 INJECTION INTRAMUSCULAR; INTRAVENOUS at 22:23

## 2022-07-15 ASSESSMENT — FIBROSIS 4 INDEX: FIB4 SCORE: 0.74

## 2022-07-16 VITALS
BODY MASS INDEX: 22.08 KG/M2 | HEIGHT: 62 IN | SYSTOLIC BLOOD PRESSURE: 89 MMHG | HEART RATE: 68 BPM | WEIGHT: 120 LBS | DIASTOLIC BLOOD PRESSURE: 51 MMHG | RESPIRATION RATE: 11 BRPM | TEMPERATURE: 98.1 F | OXYGEN SATURATION: 98 %

## 2022-07-16 LAB — APAP SERPL-MCNC: 17 UG/ML (ref 10–30)

## 2022-07-16 PROCEDURE — A9270 NON-COVERED ITEM OR SERVICE: HCPCS | Performed by: EMERGENCY MEDICINE

## 2022-07-16 PROCEDURE — 700105 HCHG RX REV CODE 258: Performed by: EMERGENCY MEDICINE

## 2022-07-16 PROCEDURE — 80143 DRUG ASSAY ACETAMINOPHEN: CPT

## 2022-07-16 PROCEDURE — 700102 HCHG RX REV CODE 250 W/ 637 OVERRIDE(OP): Performed by: EMERGENCY MEDICINE

## 2022-07-16 PROCEDURE — 90791 PSYCH DIAGNOSTIC EVALUATION: CPT

## 2022-07-16 RX ORDER — CITALOPRAM 20 MG/1
40 TABLET ORAL DAILY
Status: DISCONTINUED | OUTPATIENT
Start: 2022-07-16 | End: 2022-07-16

## 2022-07-16 RX ORDER — HYDROXYZINE HYDROCHLORIDE 25 MG/1
25 TABLET, FILM COATED ORAL ONCE
Status: COMPLETED | OUTPATIENT
Start: 2022-07-16 | End: 2022-07-16

## 2022-07-16 RX ORDER — SODIUM CHLORIDE 9 MG/ML
1000 INJECTION, SOLUTION INTRAVENOUS ONCE
Status: COMPLETED | OUTPATIENT
Start: 2022-07-16 | End: 2022-07-16

## 2022-07-16 RX ORDER — CITALOPRAM 20 MG/1
40 TABLET ORAL DAILY
Status: DISCONTINUED | OUTPATIENT
Start: 2022-07-17 | End: 2022-07-16 | Stop reason: HOSPADM

## 2022-07-16 RX ADMIN — HYDROXYZINE HYDROCHLORIDE 25 MG: 25 TABLET ORAL at 03:46

## 2022-07-16 RX ADMIN — SODIUM CHLORIDE 1000 ML: 9 INJECTION, SOLUTION INTRAVENOUS at 01:45

## 2022-07-16 NOTE — DISCHARGE SUMMARY
"  ED Observation Discharge Summary    Patient:Johanne Raya  Patient : 1980  Patient MRN: [id number]  Patient PCP: No primary care provider on file.    Admit Date: 7/15/2022  Discharge Date and Time: 22 2:15 PM  Discharge Diagnosis: Suicidal ideation, intentional overdose  Discharge Attending: Viri Leal M.D.  Discharge Service: ED Observation    ED Course  Johanne is a 42 y.o. female who was evaluated at [facility] on 7/15/2022 for evaluation of an intentional overdose.  She stated that she was tired of living, reports she took half of a bottle of Vicodin, as well as 4 Celexa tablets.  She also reported taking a gram of Tylenol.  She had no evidence of anticholinergic toxidrome, 4-hour Tylenol level was obtained.  Case was discussed with poison control.  She was observed, and medically cleared.  She was then transferred to [facility] this morning for further inpatient treatment and stabilization.    Discharge Exam:  BP (!) 89/51   Pulse 68   Temp 36.7 °C (98.1 °F) (Temporal)   Resp (!) 11   Ht 1.575 m (5' 2\")   Wt 54.4 kg (120 lb)   SpO2 98%   BMI 21.95 kg/m² .    Constitutional: Awake and alert. Nontoxic  HENT: Atraumatic  Eyes: No periorbital edema  Neck: Normal range of motion  Cardiovascular: Normal heart rate, no tachycardia  Thorax & Lungs: Normal work of breathing  Abdomen: Nontender  Skin:  No pathologic rash.   Extremities: Well perfused  Psychiatric: Reported suicidal ideation with active self-harm, intentional overdose    Labs  All labs reviewed.    Radiology  No orders to display         Medications:   Discharge Medication List as of 2022  8:30 AM          Discharge Condition: Stable    Electronically signed by: Viri Leal M.D., 2022 2:15 PM     "

## 2022-07-16 NOTE — ED NOTES
Pt ambulated to restroom for urine sample. Pt back in bed and on monitor. Tried to call  but no answer at this time.

## 2022-07-16 NOTE — CONSULTS
"[facility]   TRIAGE ASSESSMENT    Name: Johanne Raya  MRN: [id number]  : 1980  Age: 42 y.o.  Date of assessment: 2022  PCP: No primary care provider on file.  Persons in attendance: Patient  Patient Location: [facility]    CHIEF COMPLAINT/PRESENTING ISSUE (as stated by patient): Pt is a 43 y/o female BIB EMS after attempting suicide by overdosing on hydrocodone, tylenol, and citalopram; pt also drank a pint of vodka. MAURISIO on arrival to ED was 0.168. At time of behavioral health consult, MAURISIO 0.08. Reports hx of SI and SA's; states that was \"a long time ago.\" Denies self-harm. Denies HI/hallucinations. Pt reports feeling frustrated due to issues with having her mom living in her home and states, \"I just lost it.\" Pt also lives with  and her son. Pt states her  and mom drink alcohol daily; pt was sober for 8 months until approx two weeks ago. Pt is not currently receiving any outpatient psychiatric services; Denies hx of inpatient psychiatric hospitalizations. Pt denies substance use; UDS +opiates (ingested hydrocodone during suicide attempt). Findings discussed with ERP who agrees pt needs to transfer to an inpatient psychiatric facility for further evaluation and stabilization. Legal hold initiated. [place] insurance plan. Inpatient psychiatric referrals sent to [facility], [facility], and [facility]. Outpatient psychiatric referral faxed to [facility].   Chief Complaint   Patient presents with   • Drug Overdose     Pt bib ems for SI after trying to OD on [id number] tylenol, 650mg hydrocodone, and 400mg citalopram   • Suicidal Ideation        CURRENT LIVING SITUATION/SOCIAL SUPPORT/FINANCIAL RESOURCES: Pt lives with  and her mom; reports having mom living with her has been difficult. She has a son that lives with her. Pt states her  and mom drink alcohol daily; pt was sober for 8 months until approx two weeks " "ago. Works full-time. Reports  as support system.     BEHAVIORAL HEALTH/SUBSTANCE USE TREATMENT HISTORY  Does patient/parent report a history of prior behavioral health/substance use treatment for patient?   Pt is not currently receiving any outpatient psychiatric services; Denies hx of inpatient psychiatric hospitalizations.     SAFETY ASSESSMENT - SELF  Does patient acknowledge current or past symptoms of dangerousness to self or is previous history noted? yes  Does parent/significant other report patient has current or past symptoms of dangerousness to self? N\A  Does presenting problem suggest symptoms of dangerousness to self? Yes:     Past Current    Suicidal Thoughts: [x]  []    Suicidal Plans: [x]  []    Suicidal Intent: [x]  []    Suicide Attempts: [x]  [x]    Self-Injury []  []      For any boxes checked above, provide detail: Pt attempted suicide by overdosing on hydrocodone, tylenol, and citalopram; pt also drank a pint of vodka. Reports hx of SI and SA's; states that was \"a long time ago.\" Denies self-harm.     History of suicide by family member: no  History of suicide by friend/significant other: no  Recent change in frequency/specificity/intensity of suicidal thoughts or self-harm behavior? yes - intensified tonight after altercation with her mom that lives with her.   Current access to firearms, medications, or other identified means of suicide/self-harm? yes - pt has access to means of SI.   If yes, willing to restrict access to means of suicide/self-harm? yes - placed on legal hold and belongings secured; awaiting transfer to inpatient psychiatric facility.   Protective factors present:  Actively engaged in treatment and Willing to address in treatment    SAFETY ASSESSMENT - OTHERS  Does patient acknowledge current or past symptoms of aggressive behavior or risk to others or is previous history noted? no  Does parent/significant other report patient has current or past symptoms of " "aggressive behavior or risk to others?  N\A  Does presenting problem suggest symptoms of dangerousness to others? No; Denies HI.    LEGAL HISTORY  Does patient acknowledge history of arrest/long-term/care home or is previous history noted? no    Crisis Safety Plan completed and copy given to patient? N\A    ABUSE/NEGLECT SCREENING  Does patient report feeling “unsafe” in his/her home, or afraid of anyone?  no  Does patient report any history of physical, sexual, or emotional abuse?  no  Does parent or significant other report any of the above? N\A  Is there evidence of neglect by self?  no  Is there evidence of neglect by a caregiver? N/A  Does the patient/parent report any history of CPS/APS/police involvement related to suspected abuse/neglect or domestic violence? no  Based on the information provided during the current assessment, is a mandated report of suspected abuse/neglect being made?  No    SUBSTANCE USE SCREENING  Yes:  Pierre all substances used in the past 30 days:      Last Use Amount   [x]   Alcohol 07/15/2022 Not specified   []   Marijuana     []   Heroin     []   Prescription Opioids  (used without prescription, for    recreation, or in excess of prescribed amount)     []   Other Prescription  (used without prescription, for    recreation, or in excess of prescribed amount)     []   Cocaine      []   Methamphetamine     []   \"\" drugs (ectasy, MDMA)     []   Other substances        UDS results: +opiates  Breathalyzer results: 250.3 @ 2150; 0.168 @ 2154; 0.08 @ 0215    What consequences does the patient associate with any of the above substance use and or addictive behaviors? None    Risk factors for detox (check all that apply):  []  Seizures   []  Diaphoretic (sweating)   []  Tremors   []  Hallucinations   []  Increased blood pressure   []  Decreased blood pressure   []  Other   [x]  None      [x] Patient education on risk factors for detoxification and instructed to return to ER as " needed.      MENTAL STATUS   Participation: Active verbal participation, Attentive, Engaged and Open to feedback  Grooming: Casual  Orientation: Alert and Fully Oriented  Behavior: Calm  Eye contact: Good  Mood: Anxious  Affect: Anxious and Tearful  Thought process: Logical and Goal-directed  Thought content: Within normal limits  Speech: Rate within normal limits and Volume within normal limits  Perception: Within normal limits  Memory:  No gross evidence of memory deficits  Insight: Poor  Judgment:  Poor  Other:    Collateral information:   Source:  [] Significant other present in person:   [] Significant other by telephone  [] [facility]   [x] [facility]  [x] [facility] Medical Record  [x] Other: ERP    [] Unable to complete full assessment due to:  [] Acute intoxication  [] Patient declined to participate/engage  [] Patient verbally unresponsive  [] Significant cognitive deficits  [] Significant perceptual distortions or behavioral disorganization  [x] Other: N/A      CLINICAL IMPRESSIONS:  Primary:  Suicide Attempt  Secondary: Situational depression        IDENTIFIED NEEDS/PLAN:  [Trigger DISPOSITION list for any items marked]    [x]  Imminent safety risk - self [] Imminent safety risk - others   []  Acute substance withdrawal []  Psychosis/Impaired reality testing   [x]  Mood/anxiety [x]  Substance use/Addictive behavior   [x]  Maladaptive behaviro []  Parent/child conflict   []  Family/Couples conflict []  Biomedical   []  Housing []  Financial   []   Legal  Occupational/Educational   []  Domestic violence []  Other:     Recommended Plan of Care:  Actively being addressed by Legal [facility] Emergency Department, Refer to inpatient psychiatric facilities and 1:1 Observation. Pt attempted suicide by overdosing on hydrocodone, tylenol, and citalopram; pt also drank a pint of vodka. Denies HI/hallucinations. Findings discussed with ERP who agrees pt needs to transfer to an inpatient  psychiatric facility for further evaluation and stabilization. Legal hold initiated. [place] insurance plan. Inpatient psychiatric referrals sent to [facility], [facility], and [facility]. Outpatient psychiatric referral faxed to [facility].   *Telesitter may not be utilized for moderate or high risk patients    Has the Recommended Plan of Care/Level of Observation been reviewed with the patient's assigned nurse? Yes; high risk on [place]; 1:1 sitter required    Does patient/parent or guardian express agreement with the above plan? yes      Referral appointment(s) scheduled? N\A    Alert team only:   I have discussed findings and recommendations with Dr. Romero who is in agreement with these recommendations.     Referral information sent to the following outpatient community providers : [facility]    Referral information sent to the following inpatient community providers : [facility], [facility], [facility]    If applicable : Referred  to  Alert Team for legal hold follow up at (time): 7/16/2022 @ 0240      Rubi Galvan R.N.  7/16/2022

## 2022-07-16 NOTE — ED NOTES
Assumed care from JOSSE Jimenez. Patient resting on gurney with eyes closed. Respirations even and unlabored. No s/s of distress noted. Patient remains under close observation.

## 2022-07-16 NOTE — DISCHARGE INSTRUCTIONS
You were evaluated today for suicidal ideation. Your physical exam and labs were reassuring. You were medically cleared in the emergency department, had a psychiatric evaluation performed and you are being discharged from the emergency department. Please follow all the recommendations set by your psychiatrist.    If you have thoughts of suicide, please seek help. This may be a family member, friend, mental health professional, suicide hotline, or any emergency department.     National Suicide Prevention Lifeline: 1-230.574.6693  Available 24hours a day, 7 days a week    Please return to the ED or seek medical attention if you develop:  Fever, severe headache, vomiting, thoughts of suicide or other concerning findings

## 2022-07-16 NOTE — ED NOTES
Pt resting at this time chest rise and fall seen. Pt also placed on 2l while she sleeps d/t o2 sats 89%. 1:1 sitter in front of room

## 2022-07-16 NOTE — ED NOTES
Report given to MACKENZIE. Patient alert and aware that she is being transferred to [facility]. Patient agreeable and cooperative.

## 2022-07-16 NOTE — DISCHARGE PLANNING
[facility] ED Behavioral Health Fax Referral      [facility] ED Behavioral Health Alert Team:  [phone]    Referral: Legal Hold    Intervention: Patient referral to [facility] inpatient  facillity    Legal Hold Initiated: Date: 7/16/2022 Time: 0240    Patient’s Insurance Listed on Face Sheet: [place]    [facility], [facility], [facility]    Referrals faxed by: Rubi Galvan RN    This referral contains the following information:  1) Face sheet _x___  2) Page 1 and Page 2 of Legal Hold __x__  3) Alert Team Assessment/Psych Assessment __x__  4) Head to toe physical exam __x__  5) Urine Drug Screen __x__  6) Blood Alcohol __x__  7) Vital signs __x__  8) Pregnancy test when applicable __x_  9) Medications list __x__  10) Covid screening ____    Plan: Patient will transfer to mental health facility once acceptance is obtained

## 2022-07-16 NOTE — ED PROVIDER NOTES
ED Provider Note      Means of Arrival: EMS  History obtained from: Patient, EMS    CHIEF COMPLAINT  Chief Complaint   Patient presents with   • Drug Overdose     Pt bib ems for SI after trying to OD on [id number] tylenol, 650mg hydrocodone, and 400mg citalopram   • Suicidal Ideation       HPI  Johanne Raya is a 42 y.o. female who presents with suicide attempt by drug overdose.  The patient reports that she is tired of caring for living with her mother, who is very mean to her.  The patient reports that she used to drink more alcohol, but now drinks every 3 to 4 days.  Her mother called her and alcoholic.  Patient reports that she drank hard alcohol tonight as well as took half a bottle of Vicodin leftover from her surgery, and reports taking 4 pills of her Celexa.  She also reports that approximate 3 PM she took 1 g of Tylenol for headache.  She currently endorses only nausea from her medications.  She reports vomiting once since ingesting the medications at approximately 9 PM tonight.  She is unsure what was in the vomitus.  She denies any chest pain, shortness of breath, abdominal pains.    REVIEW OF SYSTEMS  CONSTITUTIONAL:  No fever.  CARDIOVASCULAR:  No chest discomfort.  RESPIRATORY:  No pleuritic chest pain.  GASTROINTESTINAL:  No abdominal pain.  Endorses nausea  GENITOURINARY:   No dysuria.  See HPI for further details.   All other systems are negative.     PAST MEDICAL HISTORY  Past Medical History:   Diagnosis Date   • Anxiety and depression    • Snoring     no sleep study       FAMILY HISTORY  History reviewed. No pertinent family history.   Patient denies family history of acetaminophen overdose.    SOCIAL HISTORY   reports that she quit smoking about 10 months ago. Her smoking use included cigarettes. She has a 16.00 pack-year smoking history. She has never used smokeless tobacco. She reports current alcohol use. She reports current drug use. Drug: Inhaled.    SURGICAL HISTORY  Past Surgical  "History:   Procedure Laterality Date   • CA LAP, OCTAVIA RESTRICT PROC, LONGITUDINAL GAS*  9/15/2021    Procedure: GASTRECTOMY, SLEEVE, LAPAROSCOPIC;  Surgeon: Regulo Hugo M.D.;  Location: SURGERY [facility];  Service: General   • APPENDECTOMY     • TUBAL LIGATION     • OTHER ORTHOPEDIC SURGERY Left 2010    foot surgery to remove extra bone   • PRIMARY C SECTION         • TONSILLECTOMY      as a child       CURRENT MEDICATIONS  Home Medications     Reviewed by Tony Riojas R.N. (Registered Nurse) on 07/15/22 at 2142  Med List Status: Not Addressed   Medication Last Dose Status   acetaminophen (TYLENOL) 500 MG Tab  Active   citalopram (CELEXA) 40 MG Tab  Active   nicotine polacrilex (NICORETTE) 2 MG Gum  Active   NICOTINE TD  Active   Pediatric Multivit-Minerals-C (CHEWABLES MULTIVITAMIN PO)  Active                ALLERGIES  No Active Allergies    PHYSICAL EXAM  VITAL SIGNS: BP (!) 87/50   Pulse 75   Temp 36.7 °C (98.1 °F) (Temporal)   Resp 12   Ht 1.575 m (5' 2\")   Wt 54.4 kg (120 lb)   SpO2 98%   BMI 21.95 kg/m²    Gen: Alert  HENT: ATNC  Eyes: Normal conjunctiva, pupils constricted, EOMI  Neck: trachea midline  Resp: no respiratory distress, clear to auscultation bilaterally  CV: No JVD, RRR, no murmurs, rubs, gallops  Abd: non-distended, soft, nontender  Ext: No deformities  Psych: Depressed mood  Neuro: speech fluent, moves all extremities.  2 beats ankle clonus, 3+ patellar reflexes  Skin: Warm, appropriately moist.      RADIOLOGY/PROCEDURES  No orders to display       LABS  Labs Reviewed   URINE DRUG SCREEN - Abnormal; Notable for the following components:       Result Value    Opiates Positive (*)     All other components within normal limits   DIAGNOSTIC ALCOHOL - Abnormal; Notable for the following components:    Diagnostic Alcohol 250.3 (*)     All other components within normal limits   CBC WITH DIFFERENTIAL - Abnormal; Notable for the following components:    Platelet " Count 470 (*)     All other components within normal limits   COMP METABOLIC PANEL - Abnormal; Notable for the following components:    Potassium 3.4 (*)     Co2 19 (*)     Glucose 128 (*)     All other components within normal limits   LIPASE - Abnormal; Notable for the following components:    Lipase 103 (*)     All other components within normal limits   SALICYLATE - Abnormal; Notable for the following components:    Salicylates, Quant. <1.0 (*)     All other components within normal limits   ACETAMINOPHEN   HCG QUALITATIVE UR   ESTIMATED GFR   ACETAMINOPHEN    Narrative:     4 hr level from 9pm ingestion        EKG  Results for orders placed or performed during the hospital encounter of 07/15/22   EKG (NOW)   Result Value Ref Range    Report       [facility] Emergency Dept.    Test Date:  2022-07-15  Pt Name:    RODOLFO CANTU               Department: ER  MRN:        [id number]                      Room:       [facility]  Gender:     Female                       Technician: [id number]  :        1980                   Requested By:NANCY ROMERO  Order #:    [id number]                    Reading MD: Nancy Romero    Measurements  Intervals                                Axis  Rate:       94                           P:          82  ND:         136                          QRS:        -6  QRSD:       90                           T:          37  QT:         372  QTc:        466    Interpretive Statements  SINUS RHYTHM  LOW VOLTAGE THROUGHOUT  BORDERLINE T ABNORMALITIES, ANTERIOR LEADS  Compared to ECG 2021 10:28:54  Low QRS voltage now present  T-wave abnormality now present  Electronically Signed On 7- 22:05:34 PDT by Nancy Romero          COURSE & MEDICAL DECISION MAKING  Pertinent Labs & Imaging studies reviewed. (See chart for details)    Patient presents after an intentional overdose.  She reports taking a gram of Tylenol around 3 PM, however likely more when she took the liquid Vicodin.   She also reports alcohol and Celexa.  No respiratory distress trending to indicate need for intubation.  She does have a slight hyperreflexia but no evidence of serotonin syndrome at this time.  Demonstrating no evidence of anticholinergic toxidrome.  The patient has reassuring EKG intervals.    Patient arrives on legal hold.  Will require 4-hour Tylenol level from 9 PM for medical clearance.    11:41 PM  Patient admitted to ED observation at this time.  Patient placed in observation for repeat labs, psychiatric evaluation, likely placement.    Hydration: Patient is a 30 fluids for dehydration, low blood pressure.    Case discussed with poison control, who recommended 4-hour Tylenol level, can be medically cleared.  This is reassuring.  Case #[id number]    1:54 AM  Patient is medically cleared at this time.  Patient remains on legal hold for placement.  Patient will be signed out to the oncoming provider.  Patient does have borderline low blood pressure but is asymptomatic, sleeping comfortably, however wakes up appropriately.  At this point, I believe the patient remains safe with his blood pressure and we will continue to monitor    Appropriate PPE were worn at this encounter.     Working IMPRESSION  1. Suicidal ideation    2. Intentional drug overdose, initial encounter ([facility])      Disposition: ED observation for psychiatric treatment.        CRITICAL CARE TIME 32 minutes  There was a very real possibility of deterioration of the patient's condition.  This patient required the highest level of care.  I provided critical care services which included: review of the medical record, treatment orders, ordering and reviewing test results, frequent reevaluation of the patient's condition and response to treatment, as well as discussing the case with appropriate personnel and various consultants. The critical care time associated with the care of this patient is exclusive of any procedures or specific  interventions.      This dictation was created using voice recognition software. The accuracy of the dictation is limited to the abilities of the software. I expect there may be some errors of grammar and possibly content. The nursing notes were reviewed and certain aspects of this information were incorporated into this note.

## 2022-07-16 NOTE — ED TRIAGE NOTES
"Chief Complaint   Patient presents with   • Drug Overdose     Pt bib ems for SI after trying to OD on [id number] tylenol, 650mg hydrocodone, and 400mg citalopram   • Suicidal Ideation     Pt also states that \"she drank a pint of vodka today.\"  "

## 2022-07-23 ENCOUNTER — HOSPITAL ENCOUNTER (EMERGENCY)
Facility: MEDICAL CENTER | Age: 42
End: 2022-07-23
Attending: EMERGENCY MEDICINE
Payer: COMMERCIAL

## 2022-07-23 ENCOUNTER — OFFICE VISIT (OUTPATIENT)
Dept: URGENT CARE | Facility: PHYSICIAN GROUP | Age: 42
End: 2022-07-23
Payer: COMMERCIAL

## 2022-07-23 VITALS
BODY MASS INDEX: 21.83 KG/M2 | OXYGEN SATURATION: 98 % | DIASTOLIC BLOOD PRESSURE: 65 MMHG | RESPIRATION RATE: 16 BRPM | HEIGHT: 62 IN | WEIGHT: 118.61 LBS | SYSTOLIC BLOOD PRESSURE: 99 MMHG | TEMPERATURE: 97.4 F | HEART RATE: 88 BPM

## 2022-07-23 VITALS
DIASTOLIC BLOOD PRESSURE: 78 MMHG | OXYGEN SATURATION: 97 % | HEART RATE: 80 BPM | BODY MASS INDEX: 21.71 KG/M2 | SYSTOLIC BLOOD PRESSURE: 122 MMHG | TEMPERATURE: 97.7 F | HEIGHT: 62 IN | WEIGHT: 118 LBS | RESPIRATION RATE: 18 BRPM

## 2022-07-23 DIAGNOSIS — N75.0 BARTHOLIN CYST: ICD-10-CM

## 2022-07-23 DIAGNOSIS — F41.9 ANXIETY: ICD-10-CM

## 2022-07-23 DIAGNOSIS — F19.10 POLYSUBSTANCE ABUSE (HCC): ICD-10-CM

## 2022-07-23 LAB — POC BREATHALIZER: 0.21 PERCENT (ref 0–0.01)

## 2022-07-23 PROCEDURE — 700102 HCHG RX REV CODE 250 W/ 637 OVERRIDE(OP): Performed by: EMERGENCY MEDICINE

## 2022-07-23 PROCEDURE — A9270 NON-COVERED ITEM OR SERVICE: HCPCS | Performed by: EMERGENCY MEDICINE

## 2022-07-23 PROCEDURE — 99213 OFFICE O/P EST LOW 20 MIN: CPT | Performed by: STUDENT IN AN ORGANIZED HEALTH CARE EDUCATION/TRAINING PROGRAM

## 2022-07-23 PROCEDURE — 99284 EMERGENCY DEPT VISIT MOD MDM: CPT

## 2022-07-23 PROCEDURE — 302970 POC BREATHALIZER: Performed by: EMERGENCY MEDICINE

## 2022-07-23 RX ORDER — DIAZEPAM 5 MG/1
5 TABLET ORAL ONCE
Status: COMPLETED | OUTPATIENT
Start: 2022-07-23 | End: 2022-07-23

## 2022-07-23 RX ADMIN — DIAZEPAM 5 MG: 5 TABLET ORAL at 07:00

## 2022-07-23 ASSESSMENT — ENCOUNTER SYMPTOMS
CONSTIPATION: 0
CHILLS: 0
CARDIOVASCULAR NEGATIVE: 1
NAUSEA: 0
FEVER: 0
ABDOMINAL PAIN: 0
DIARRHEA: 0
EYES NEGATIVE: 1
FLANK PAIN: 0
RESPIRATORY NEGATIVE: 1
VOMITING: 0

## 2022-07-23 ASSESSMENT — FIBROSIS 4 INDEX
FIB4 SCORE: 0.28
FIB4 SCORE: 0.28

## 2022-07-23 NOTE — ED TRIAGE NOTES
"Chief Complaint   Patient presents with   • Psych Eval     Pt states discharged from [facility] yesterday and needs to return for medication administration for anxiety, +ETOH        BIB [facility], ambulated to triage for above complaint. ETOH tonight was 1/5th of vodka. -HI/SI. EMS vitals 116/91 HR 72 97% RA glucose 104    Pt educated of triage process and informed to contact staff if situation changes.    /73   Pulse 81   Temp 36.6 °C (97.8 °F) (Temporal)   Resp 20   Ht 1.575 m (5' 2\")   Wt 53.8 kg (118 lb 9.7 oz)   LMP  (LMP Unknown)   SpO2 94%   BMI 21.69 kg/m²      "

## 2022-07-23 NOTE — ED NOTES
Report received, assuming care.  Pt calling  for a ride home.  Alert, oriented, ambulatory, calm, cooperative.  States no needs at this time.

## 2022-07-23 NOTE — ED NOTES
"Pt telling staff to \"shut your fucking mouth\". Pt educated on triage process and asked not to use foul language in the lobby. Pt continued to get verbally aggressive with staff, then stating \"fuck you I am leaving\". Pt last seen yelling and leaving the ER lobby. Will attempt to locate pt, if unable to find pt she will be dismissed per EPIC  "

## 2022-07-23 NOTE — DISCHARGE PLANNING
This pt denies any si, hi or psychosis. She was looking for detox treatment but she was discharged from [facility] 36hrs ago after psy inpt treatment which disqualifies her for readmit. [facility] could not treat her because of insrance and [facility] was full this weekend. She will be discharged home and peer recovery has consulted and given resources.

## 2022-07-23 NOTE — ED NOTES
Patients  here to take her home.  Discharge instructions provided, pt verbalized understanding and signed.  Leaves ER ambulatory, steady gait, no distress.

## 2022-07-23 NOTE — PROGRESS NOTES
"Subjective     Johanne Raya is a 42 y.o. female who presents with Cyst (On her vaginal area. )            Patient presents today for a bump on the left side of her vagina. She noticed it a few hours a ago and it appeared abnormal. She notes no tenderness to palpation. No increase in temperature. She reports no urinary symptoms and no increase in vaginal discharge or change in vaginal odor. Patient denies fever/chills, abdominal pain, nausea/vomiting/diarrhea.      Review of Systems   Constitutional: Negative for chills and fever.   HENT: Negative.    Eyes: Negative.    Respiratory: Negative.    Cardiovascular: Negative.    Gastrointestinal: Negative for abdominal pain, constipation, diarrhea, nausea and vomiting.   Genitourinary: Negative for dysuria, flank pain, frequency, hematuria and urgency.        Positive for vaginal bump.              Objective     /78   Pulse 80   Temp 36.5 °C (97.7 °F) (Temporal)   Resp 18   Ht 1.575 m (5' 2\")   Wt 53.5 kg (118 lb)   LMP  (LMP Unknown)   SpO2 97%   BMI 21.58 kg/m²      Physical Exam  Vitals reviewed. Exam conducted with a chaperone present.   Constitutional:       Appearance: Normal appearance.   Cardiovascular:      Rate and Rhythm: Normal rate and regular rhythm.      Heart sounds: Normal heart sounds.   Pulmonary:      Breath sounds: Normal breath sounds.   Abdominal:      General: Abdomen is flat. Bowel sounds are normal.      Palpations: Abdomen is soft.   Genitourinary:      Neurological:      Mental Status: She is alert.                             Assessment & Plan        1. Bartholin cyst    Small mass (<3 cm in size) may be managed expectantly. Patinet educated on supportive measures including sitz baths or warm compresses with the goal of eliciting drainage of the cyst contents and resolution of the mass.    Differential diagnoses, supportive care, and indications for immediate follow-up discussed with patient. Pathogenesis of diagnosis " discussed including typical length and natural progression.      Instructed to return to urgent care or nearest emergency department if symptoms fail to improve, for any change in condition, further concerns, or new concerning symptoms.    Patient states understanding and agrees with the plan of care and discharge instructions.

## 2022-07-23 NOTE — ED PROVIDER NOTES
" ED Provider Note      Means of Arrival: Ambulatory  History obtained from: Patient, medical record    CHIEF COMPLAINT  Chief Complaint   Patient presents with   • Psych Eval     Pt states discharged from [facility] yesterday and needs to return for medication administration for anxiety, +ETOH       HPI  Johanne Raya is a 42 y.o. female who presents requesting psychiatric treatment.  The patient reports that she feels anxious.  She reports she was just discharged from [facility] yesterday, and has been taking her Celexa but reports that during group therapy and the trazodone was helpful to her.  She would like to go back voluntarily.  She denies SI/HI.  When asked about alcohol or recreational drugs she states \"all of the above\".  She denies any fevers, chills, chest pains, other complaints at this time.    REVIEW OF SYSTEMS  CONSTITUTIONAL:  No fever.  CARDIOVASCULAR:  No chest discomfort.  RESPIRATORY:  No pleuritic chest pain.  GASTROINTESTINAL:  No abdominal pain.  GENITOURINARY:   No dysuria.    See HPI for further details.   All other systems are negative.     PAST MEDICAL HISTORY  Past Medical History:   Diagnosis Date   • Anxiety and depression    • Snoring     no sleep study       FAMILY HISTORY  History reviewed. No pertinent family history.   No family history of suicide    SOCIAL HISTORY   reports that she has been smoking cigarettes. She has a 16.00 pack-year smoking history. She has never used smokeless tobacco. She reports current alcohol use. She reports current drug use. Drug: Inhaled.    SURGICAL HISTORY  Past Surgical History:   Procedure Laterality Date   • OR LAP, OCTAVIA RESTRICT PROC, LONGITUDINAL GAS*  9/15/2021    Procedure: GASTRECTOMY, SLEEVE, LAPAROSCOPIC;  Surgeon: Regulo Hugo M.D.;  Location: SURGERY [facility];  Service: General   • APPENDECTOMY  2014   • TUBAL LIGATION  2010   • OTHER ORTHOPEDIC SURGERY Left 2010    foot surgery to remove extra bone " "  • PRIMARY C SECTION         • TONSILLECTOMY      as a child       CURRENT MEDICATIONS  Home Medications     Reviewed by Elizabeth Lamas R.N. (Registered Nurse) on 22 at 0044  Med List Status: Partial   Medication Last Dose Status   acetaminophen (TYLENOL) 500 MG Tab  Active   citalopram (CELEXA) 40 MG Tab  Active   nicotine polacrilex (NICORETTE) 2 MG Gum  Active   NICOTINE TD  Active   Pediatric Multivit-Minerals-C (CHEWABLES MULTIVITAMIN PO)  Active                ALLERGIES  No Active Allergies    PHYSICAL EXAM  VITAL SIGNS: BP (!) 96/59   Pulse 85   Temp 36.3 °C (97.4 °F) (Temporal)   Resp 16   Ht 1.575 m (5' 2\")   Wt 53.8 kg (118 lb 9.7 oz)   LMP  (LMP Unknown)   SpO2 92%   BMI 21.69 kg/m²    Gen: Alert  HENT: ATNC  Eyes: Normal conjunctiva  Neck: trachea midline  Resp: no respiratory distress  CV: No JVD, RRR  Abd: non-distended  Ext: No deformities  Psych: normal mood  Neuro: speech slightly slurred, moves all extremities, GCS 15      LABS  Labs Reviewed   POC BREATHALIZER - Abnormal; Notable for the following components:       Result Value    POC Breathalizer 0.206 (*)     All other components within normal limits        COURSE & MEDICAL DECISION MAKING  Pertinent Labs & Imaging studies reviewed. (See chart for details)    Patient presents with voluntary request for further psychiatric treatment.  The patient denies any SI/HI.  She peers to demonstrate no organic medical condition.  Possible sedative-hypnotic toxidrome, will obtain breathalyzer.  Patient does not meet criteria for legal hold.  We will discussed the case with the alert team for voluntary placement and treatment.    5:05 AM  Patient will be admitted to ED observation at this time for clinical sobriety and safe discharge planning.    Case discussed with Pierre of the alert team.  The patient's insurance would not allow her to go to [facility].  All of the other detox facilities are currently full and she will not be " accepted there.  She cannot return to [facility] at this time as she was just discharged from there.  As we have no clear disposition, we allow the patient to sober up in the emergency department, and will attempt to contact family members for warm handoff to ensure the patient has a safe discharge.    6:41 AM  I discussed the patient's condition with her at the bedside.  She is currently a little fidgety and anxious, may have some early withdrawal.  Will provide p.o. diazepam.  She states that she can contact her  who will pick her up.  She continues to deny any suicidal ideation.  At this point, I believe she will be safe to discharge home in the care of her family. Patient is discharged from ED observation at this time in stable condition    Appropriate PPE were worn at this encounter.     The patient was given return precautions, anticipatory guidance, and the opportunity to ask questions prior to discharge.     FINAL IMPRESSION  1. Anxiety    2. Polysubstance abuse (HCC)           DISPOSITION:  Patient will be discharged home in stable condition.    FOLLOW UP:  [facility] - Behavioral Health Counseling  [address] [facility] [address]  [phone]        [facility] ([facility]) - Primary Care and Family Medicine  [address] [address]  [phone]        [facility], Emergency Dept  [address] [address] [phone]    If symptoms worsen      This dictation was created using voice recognition software. The accuracy of the dictation is limited to the abilities of the software. I expect there may be some errors of grammar and possibly content. The nursing notes were reviewed and certain aspects of this information were incorporated into this note.

## 2023-04-06 ENCOUNTER — OFFICE VISIT (OUTPATIENT)
Dept: URGENT CARE | Facility: PHYSICIAN GROUP | Age: 43
End: 2023-04-06
Payer: COMMERCIAL

## 2023-04-06 VITALS
HEIGHT: 62 IN | HEART RATE: 92 BPM | RESPIRATION RATE: 14 BRPM | DIASTOLIC BLOOD PRESSURE: 80 MMHG | OXYGEN SATURATION: 100 % | WEIGHT: 115 LBS | SYSTOLIC BLOOD PRESSURE: 112 MMHG | TEMPERATURE: 98.2 F | BODY MASS INDEX: 21.16 KG/M2

## 2023-04-06 DIAGNOSIS — F41.0 PANIC ATTACK: ICD-10-CM

## 2023-04-06 PROCEDURE — 99213 OFFICE O/P EST LOW 20 MIN: CPT | Performed by: REGISTERED NURSE

## 2023-04-06 RX ORDER — HYDROXYZINE HYDROCHLORIDE 25 MG/1
25 TABLET, FILM COATED ORAL 3 TIMES DAILY PRN
Qty: 30 TABLET | Refills: 0 | Status: SHIPPED | OUTPATIENT
Start: 2023-04-06 | End: 2023-07-01

## 2023-04-06 ASSESSMENT — ENCOUNTER SYMPTOMS
FEVER: 0
MYALGIAS: 0
EYE PAIN: 0
NECK PAIN: 0
BLURRED VISION: 0
PALPITATIONS: 0
HEADACHES: 0
DIZZINESS: 0
SHORTNESS OF BREATH: 0
COUGH: 0

## 2023-04-06 ASSESSMENT — FIBROSIS 4 INDEX: FIB4 SCORE: 0.29

## 2023-04-06 NOTE — PROGRESS NOTES
"Chief Complaint   Patient presents with    Anxiety     Last couple days have been having really bad anxiety, especially when driving. Shaking, can feel it in legs, got off celexa and started OTC \"[place] Varina\" patches which helped at first but not anymore.      HPI:   Patient is a 43 y.o. female who is presenting for anxiety symptoms including SOB, shaking, leg numbness for 3 days, bad when driving and at stop lights. Is trying to mindful breathing techniques but not helping. Her symptoms do resolve without medications but would like additional support. Was previously on celexa for this but then she stopped because her OTC Gilda patches were working. Denies underlying heart or lung issues.  Denies HI/SI.    No Known Allergies     Current Outpatient Medications Ordered in Epic   Medication Sig Dispense Refill    Multiple Vitamin (MULTI-VITAMIN DAILY PO) Take  by mouth.      NON SPECIFIED \"[place] Varina\" Anxiety Patches      hydrOXYzine HCl (ATARAX) 25 MG Tab Take 1 Tablet by mouth 3 times a day as needed for Anxiety. 30 Tablet 0    acetaminophen (TYLENOL) 500 MG Tab Take 500-1,000 mg by mouth every 6 hours as needed. (Patient not taking: Reported on 2022)      Pediatric Multivit-Minerals-C (CHEWABLES MULTIVITAMIN PO) Take 2 Tablets by mouth every day. (Patient not taking: Reported on 2023)      NICOTINE TD Place 1 Patch on the skin every day. (Patient not taking: Reported on 2022)      nicotine polacrilex (NICORETTE) 2 MG Gum Take 2 mg by mouth as needed for Smoking Cessation. (Patient not taking: Reported on 2022)       No current [facility]-ordered facility-administered medications on file.     Pertinent history: Anxiety    Social History     Tobacco Use    Smoking status: Every Day     Packs/day: 1.00     Years: 16.00     Pack years: 16.00     Types: Cigarettes     Last attempt to quit: 2021     Years since quittin.6    Smokeless tobacco: Never   Vaping Use    Vaping Use: Never used   Substance Use Topics    " Alcohol use: Yes     Comment: when able to drink consumes 1/5    Drug use: Not Currently     Types: Inhaled     Comment: marijuana     Review of Systems   Constitutional:  Negative for fever.   Eyes:  Negative for blurred vision and pain.   Respiratory:  Negative for cough and shortness of breath.    Cardiovascular:  Negative for chest pain, palpitations and leg swelling.   Musculoskeletal:  Negative for myalgias and neck pain.   Skin:  Negative for rash.   Neurological:  Negative for dizziness and headaches.      Vitals:    04/06/23 0903   BP: 112/80   Pulse: 92   Resp: 14   Temp: 36.8 °C (98.2 °F)   SpO2: 100%       Physical Exam  Vitals and nursing note reviewed.   Constitutional:       General: She is not in acute distress.     Appearance: She is not ill-appearing or diaphoretic.   HENT:      Head: Normocephalic.      Mouth/Throat:      Mouth: Mucous membranes are moist.   Eyes:      Pupils: Pupils are equal, round, and reactive to light.   Cardiovascular:      Rate and Rhythm: Normal rate and regular rhythm.   Pulmonary:      Effort: Pulmonary effort is normal. No respiratory distress.      Breath sounds: Normal breath sounds.   Musculoskeletal:      Cervical back: Normal range of motion. No rigidity.   Skin:     General: Skin is warm and dry.      Capillary Refill: Capillary refill takes less than 2 seconds.   Neurological:      General: No focal deficit present.      Mental Status: She is alert and oriented to person, place, and time.      Cranial Nerves: No cranial nerve deficit.      Sensory: No sensory deficit.   Psychiatric:         Mood and Affect: Mood normal.     Assessment/Plan:  1. Panic attack  hydrOXYzine HCl (ATARAX) 25 MG Tab    Referral to Psychiatry      In office vital signs are stable, not currently experiencing symptoms.  Driving seems to be a trigger and she describes panic attack like symptoms.  Was previously on Celexa but stopped due to improving symptoms.  Will provide hydroxyzine for  episodic panic attacks.  Did recommend that she follow-up with her primary care provider in discuss restarting Celexa, will also place referral to psychiatry for additional evaluation.  Will provide note for work today.  Continue with mindful breathing and stress reduction techniques.  Adequate hydration.  Daily outdoor activity weather permitting.    Return to clinic or go to ED if symptoms worsen or persist. Indications for ED discussed at length. Patient/Parent/Guardian voices understanding. Follow-up with your primary care provider in 3-5 days. Red flag symptoms discussed. All side effects of medication discussed including allergic response, GI upset, tendon injury, rash, sedation etc.    Please note that this dictation was created using voice recognition software. I have made every reasonable attempt to correct obvious errors, but I expect that there are errors of grammar and possibly content that I did not discover before finalizing the note.

## 2023-04-06 NOTE — LETTER
April 6, 2023         Patient: Johanne Raya   YOB: 1980   Date of Visit: 4/6/2023           To Whom it May Concern:    Johanne Raya was seen in my clinic on 4/6/2023. She may return to work on 04/07/23.    If you have any questions or concerns, please don't hesitate to call.        Sincerely,           CARLOS Bansal.  Electronically Signed

## 2023-05-26 ENCOUNTER — HOSPITAL ENCOUNTER (EMERGENCY)
Facility: MEDICAL CENTER | Age: 43
End: 2023-05-27
Attending: EMERGENCY MEDICINE
Payer: COMMERCIAL

## 2023-05-26 DIAGNOSIS — F10.10 ALCOHOL ABUSE: ICD-10-CM

## 2023-05-26 PROCEDURE — 99284 EMERGENCY DEPT VISIT MOD MDM: CPT

## 2023-05-26 ASSESSMENT — FIBROSIS 4 INDEX: FIB4 SCORE: 0.29

## 2023-05-27 VITALS
OXYGEN SATURATION: 97 % | RESPIRATION RATE: 15 BRPM | BODY MASS INDEX: 21.16 KG/M2 | DIASTOLIC BLOOD PRESSURE: 63 MMHG | TEMPERATURE: 98.3 F | WEIGHT: 115 LBS | HEIGHT: 62 IN | HEART RATE: 95 BPM | SYSTOLIC BLOOD PRESSURE: 102 MMHG

## 2023-05-27 LAB
APPEARANCE UR: CLEAR
BILIRUB UR QL STRIP.AUTO: NEGATIVE
COLOR UR: YELLOW
GLUCOSE UR STRIP.AUTO-MCNC: NEGATIVE MG/DL
KETONES UR STRIP.AUTO-MCNC: NEGATIVE MG/DL
LEUKOCYTE ESTERASE UR QL STRIP.AUTO: NEGATIVE
MICRO URNS: NORMAL
NITRITE UR QL STRIP.AUTO: NEGATIVE
PH UR STRIP.AUTO: 5.5 [PH] (ref 5–8)
PROT UR QL STRIP: NEGATIVE MG/DL
RBC UR QL AUTO: NEGATIVE
SP GR UR STRIP.AUTO: 1.01
UROBILINOGEN UR STRIP.AUTO-MCNC: 0.2 MG/DL

## 2023-05-27 PROCEDURE — 81003 URINALYSIS AUTO W/O SCOPE: CPT

## 2023-05-27 NOTE — ED NOTES
"No answer from lobby, checked bathrooms.  Told staff \"I am getting a cigarette\". Before stepping out of the lobby.  Will check again in a few minutes.  "

## 2023-05-27 NOTE — DISCHARGE PLANNING
Alert Team:    Pt requesting resources for ETOH detox. Denies SI/HI/hallucinations. Reporting urinary frequency and kidney pain; notified ERP who will order urinalysis prior to discharge. Pt provided taxi voucher for transportation to [facility] for a voluntary intake evaluation for ETOH detox. Provided chemical dependency resource list. Updated RN and ERP.

## 2023-05-27 NOTE — ED PROVIDER NOTES
"ED Provider Note    CHIEF COMPLAINT  Chief Complaint   Patient presents with    Detox     PATIENT REQUESTING ALCOHOL DETOX. PATIENT STATES LAST DRINK WAS ABOUT 1 HOUR AGO. PT ALSO STATES ANXIETY IS REALLY HIGH RIGHT NOW.        HPI  Johanne Raya is a 43 y.o. female who presents for evaluation of \"alcohol detox.\"  Patient wants help with detoxification as she feels she cannot quit on her own.  She notes she is never had any symptoms with detox such as seizures or DTs, but feels that if she goes home she will simply end up drinking again.  She does not appear distressed and has no symptoms currently.  She notes she last drink about an hour prior to arrival and states she is feeling extremely anxious.  EXTERNAL RECORDS REVIEWED  Reviewed last office visit at [facility] in April for a panic attack.  ROS  Constitutional: No fevers or chills  Skin: No rashes  HEENT: No sore throat, runny nose  Neck: No neck pain  Chest: No pain or rashes  Pulm: No shortness of breath, or cough  Gastrointestinal: No nausea, vomiting, diarrhea, or abdominal pain.  Genitourinary: No dysuria or hematuria  Musculoskeletal: No pain, swelling, or focal weakness  Neurologic: No sensory or focal motor changes to extremities. No confusion or disorientation.  Psych: Nonsuicidal   Heme: No bleeding or bruising problems.   Immuno: No hx of recurrent infections        LIMITATION TO HISTORY   None   OUTSIDE HISTORIAN(S):  None        PAST FAM HISTORY  History reviewed. No pertinent family history.    PAST MEDICAL HISTORY   has a past medical history of Anxiety and depression and Snoring.    SOCIAL HISTORY  Social History     Tobacco Use    Smoking status: Every Day     Packs/day: 1.00     Years: 16.00     Pack years: 16.00     Types: Cigarettes     Last attempt to quit: 2021     Years since quittin.7    Smokeless tobacco: Never   Vaping Use    Vaping Use: Never used   Substance and Sexual Activity    Alcohol use: Yes     " "Comment: when able to drink consumes 1/5    Drug use: Not Currently     Types: Inhaled     Comment: marijuana    Sexual activity: Not on file       SURGICAL HISTORY   has a past surgical history that includes appendectomy (2014); tubal ligation (2010); primary c section (2005); other orthopedic surgery (Left, 2010); tonsillectomy; and lap, jalen restrict proc, longitudinal gas* (9/15/2021).    CURRENT MEDICATIONS  Home Medications    **Home medications have not yet been reviewed for this encounter**          ALLERGIES  Allergies   Allergen Reactions    Nsaids      Other reaction(s): GI bleeding    Penicillins Rash       PHYSICAL EXAM  VITAL SIGNS: /63   Pulse 95   Temp 36.8 °C (98.3 °F) (Temporal)   Resp 15   Ht 1.575 m (5' 2\")   Wt 52.2 kg (115 lb)   SpO2 97%   BMI 21.03 kg/m²    Gen: Appears tired otherwise no apparent distress  HEENT: No signs of trauma, Bilateral external ears normal, Nose normal. Conjunctiva normal, Non-icteric.   Cardiovascular: Regular rate and rhythm, no murmurs.  Capillary refill less than 3 seconds to all extremities, 2+ distal pulses.  Thorax & Lungs: Normal breath sounds, No respiratory distress, No wheezing bilateral chest rise  Abdomen: No distention  Skin: Warm, Dry, No erythema, No rash noted to exposed areas.   Extremities: Intact distal pulses, No edema  Neurologic: Alert , no facial droop, grossly normal coordination and strength    INITIAL IMPRESSION  Patient arrives for evaluation of wanting help for alcohol detox.  Although she was mildly tachycardic on arrival, she appears hemodynamically stable and does not have any findings on exam to suggest DTs or significant withdrawal.  I do not feel labs or imaging will benefit her but I will attempt to see if there are resources for her to be taken directly to alcohol rehab from the emergency department.  She is not suicidal or homicidal.  LABS  Results for orders placed or performed during the hospital encounter of " 05/26/23   URINALYSIS (UA)    Specimen: Urine   Result Value Ref Range    Color Yellow     Character Clear     Specific Gravity 1.015 <1.035    Ph 5.5 5.0 - 8.0    Glucose Negative Negative mg/dL    Ketones Negative Negative mg/dL    Protein Negative Negative mg/dL    Bilirubin Negative Negative    Urobilinogen, Urine 0.2 Negative    Nitrite Negative Negative    Leukocyte Esterase Negative Negative    Occult Blood Negative Negative    Micro Urine Req see below      I COURSE & MEDICAL DECISION MAKING  Pertinent Labs & Imaging studies reviewed. (See chart for details)  ED observation? No  Discussed the case with Rubi from the alert team who noted that she can get the patient to [facility] for alcohol treatment.  Patient apparently told her that she was having dysuria and therefore we will perform a urinalysis to determine whether she needs antibiotics.  This will not keep her in the hospital however, and I still do not feel serum evaluation is necessary.  She does not have any abdominal pain to suggest need for imaging and I feel she will be dischargeable to be taken directly to [facility].  She was given a cab voucher as well.  Awaiting urinalysis results.      I have discussed management of the patient with the following physicians and CAMILA's: None    Escalation of care considered, and ultimately not performed:IV fluids, blood analysis, and acute inpatient care management, however at this time, the patient is most appropriate for outpatient management    Barriers to care at this time, including but not limited to: Patient does not have established PCP, Patient lacks transportation , Patient had difficult affording medications, and Patient lacks financial resources.     Decision tools and Rx drugs considered including, but not limited to : None    Discussion of management with other \[facility] [place] [facility]\"" or appropriate source(s): None      FINAL IMPRESSION  1. Alcohol abuse        Electronically signed by:  Humphrey Song M.D., 5/26/2023 10:46 PM

## 2023-05-27 NOTE — ED NOTES
Discharge education provided. Patient verbalizes understanding. Patient AAOx4 and ambulatory to the lobby with a steady gait. Patient discharged home to self care.

## 2023-05-27 NOTE — ED TRIAGE NOTES
"Chief Complaint   Patient presents with    Detox     PATIENT REQUESTING ALCOHOL DETOX. PATIENT STATES LAST DRINK WAS ABOUT 1 HOUR AGO. PT ALSO STATES ANXIETY IS REALLY HIGH RIGHT NOW.            PT AMBULATORY TO TRIAGE. Pt AA&O X 3, SEE ABOVE.     PT TO LOBBY . PT EDUCATED ON ALERTING STAFF TO CHANGES IN CONDITION. PT VERBALIZED AN UNDERSTANDING.     /84   Pulse (!) 126   Temp 36.4 °C (97.6 °F) (Oral)   Resp 18   Ht 1.575 m (5' 2\")   Wt 52.2 kg (115 lb)   SpO2 98%   BMI 21.03 kg/m²     "

## 2023-06-30 ENCOUNTER — HOSPITAL ENCOUNTER (EMERGENCY)
Facility: MEDICAL CENTER | Age: 43
End: 2023-07-01
Attending: EMERGENCY MEDICINE
Payer: COMMERCIAL

## 2023-06-30 ENCOUNTER — APPOINTMENT (OUTPATIENT)
Dept: RADIOLOGY | Facility: MEDICAL CENTER | Age: 43
End: 2023-06-30
Attending: EMERGENCY MEDICINE
Payer: COMMERCIAL

## 2023-06-30 DIAGNOSIS — F10.10 ALCOHOL ABUSE: ICD-10-CM

## 2023-06-30 DIAGNOSIS — F10.930 ALCOHOL WITHDRAWAL SEIZURE WITHOUT COMPLICATION (HCC): ICD-10-CM

## 2023-06-30 DIAGNOSIS — R56.9 ALCOHOL WITHDRAWAL SEIZURE WITHOUT COMPLICATION (HCC): ICD-10-CM

## 2023-06-30 LAB
ALBUMIN SERPL BCP-MCNC: 4.2 G/DL (ref 3.2–4.9)
ALBUMIN/GLOB SERPL: 1.6 G/DL
ALP SERPL-CCNC: 64 U/L (ref 30–99)
ALT SERPL-CCNC: 28 U/L (ref 2–50)
ANION GAP SERPL CALC-SCNC: 18 MMOL/L (ref 7–16)
APTT PPP: 32.9 SEC (ref 24.7–36)
AST SERPL-CCNC: 21 U/L (ref 12–45)
BASOPHILS # BLD AUTO: 0.6 % (ref 0–1.8)
BASOPHILS # BLD: 0.06 K/UL (ref 0–0.12)
BILIRUB SERPL-MCNC: 0.2 MG/DL (ref 0.1–1.5)
BUN SERPL-MCNC: 30 MG/DL (ref 8–22)
CALCIUM ALBUM COR SERPL-MCNC: 8.9 MG/DL (ref 8.5–10.5)
CALCIUM SERPL-MCNC: 9.1 MG/DL (ref 8.5–10.5)
CHLORIDE SERPL-SCNC: 107 MMOL/L (ref 96–112)
CO2 SERPL-SCNC: 19 MMOL/L (ref 20–33)
CREAT SERPL-MCNC: 0.66 MG/DL (ref 0.5–1.4)
EKG IMPRESSION: NORMAL
EOSINOPHIL # BLD AUTO: 0.25 K/UL (ref 0–0.51)
EOSINOPHIL NFR BLD: 2.4 % (ref 0–6.9)
ERYTHROCYTE [DISTWIDTH] IN BLOOD BY AUTOMATED COUNT: 45.5 FL (ref 35.9–50)
ETHANOL BLD-MCNC: 209.3 MG/DL
GFR SERPLBLD CREATININE-BSD FMLA CKD-EPI: 111 ML/MIN/1.73 M 2
GLOBULIN SER CALC-MCNC: 2.6 G/DL (ref 1.9–3.5)
GLUCOSE SERPL-MCNC: 81 MG/DL (ref 65–99)
HCT VFR BLD AUTO: 42.8 % (ref 37–47)
HGB BLD-MCNC: 14.9 G/DL (ref 12–16)
IMM GRANULOCYTES # BLD AUTO: 0.05 K/UL (ref 0–0.11)
IMM GRANULOCYTES NFR BLD AUTO: 0.5 % (ref 0–0.9)
INR PPP: 1.1 (ref 0.87–1.13)
LIPASE SERPL-CCNC: 157 U/L (ref 11–82)
LYMPHOCYTES # BLD AUTO: 3.22 K/UL (ref 1–4.8)
LYMPHOCYTES NFR BLD: 31.3 % (ref 22–41)
MAGNESIUM SERPL-MCNC: 2.2 MG/DL (ref 1.5–2.5)
MCH RBC QN AUTO: 32.6 PG (ref 27–33)
MCHC RBC AUTO-ENTMCNC: 34.8 G/DL (ref 32.2–35.5)
MCV RBC AUTO: 93.7 FL (ref 81.4–97.8)
MONOCYTES # BLD AUTO: 0.6 K/UL (ref 0–0.85)
MONOCYTES NFR BLD AUTO: 5.8 % (ref 0–13.4)
NEUTROPHILS # BLD AUTO: 6.1 K/UL (ref 1.82–7.42)
NEUTROPHILS NFR BLD: 59.4 % (ref 44–72)
NRBC # BLD AUTO: 0 K/UL
NRBC BLD-RTO: 0 /100 WBC (ref 0–0.2)
PLATELET # BLD AUTO: 354 K/UL (ref 164–446)
PMV BLD AUTO: 9.2 FL (ref 9–12.9)
POTASSIUM SERPL-SCNC: 3.7 MMOL/L (ref 3.6–5.5)
PROT SERPL-MCNC: 6.8 G/DL (ref 6–8.2)
PROTHROMBIN TIME: 14.1 SEC (ref 12–14.6)
RBC # BLD AUTO: 4.57 M/UL (ref 4.2–5.4)
SODIUM SERPL-SCNC: 144 MMOL/L (ref 135–145)
TROPONIN T SERPL-MCNC: <6 NG/L (ref 6–19)
WBC # BLD AUTO: 10.3 K/UL (ref 4.8–10.8)

## 2023-06-30 PROCEDURE — 700101 HCHG RX REV CODE 250: Performed by: EMERGENCY MEDICINE

## 2023-06-30 PROCEDURE — 700111 HCHG RX REV CODE 636 W/ 250 OVERRIDE (IP): Performed by: EMERGENCY MEDICINE

## 2023-06-30 PROCEDURE — 82077 ASSAY SPEC XCP UR&BREATH IA: CPT

## 2023-06-30 PROCEDURE — 99285 EMERGENCY DEPT VISIT HI MDM: CPT

## 2023-06-30 PROCEDURE — 83690 ASSAY OF LIPASE: CPT

## 2023-06-30 PROCEDURE — 36415 COLL VENOUS BLD VENIPUNCTURE: CPT

## 2023-06-30 PROCEDURE — 85025 COMPLETE CBC W/AUTO DIFF WBC: CPT

## 2023-06-30 PROCEDURE — 96365 THER/PROPH/DIAG IV INF INIT: CPT

## 2023-06-30 PROCEDURE — 70450 CT HEAD/BRAIN W/O DYE: CPT

## 2023-06-30 PROCEDURE — 85610 PROTHROMBIN TIME: CPT

## 2023-06-30 PROCEDURE — 96366 THER/PROPH/DIAG IV INF ADDON: CPT

## 2023-06-30 PROCEDURE — 96375 TX/PRO/DX INJ NEW DRUG ADDON: CPT

## 2023-06-30 PROCEDURE — 84484 ASSAY OF TROPONIN QUANT: CPT

## 2023-06-30 PROCEDURE — 83735 ASSAY OF MAGNESIUM: CPT

## 2023-06-30 PROCEDURE — 85730 THROMBOPLASTIN TIME PARTIAL: CPT

## 2023-06-30 PROCEDURE — 71045 X-RAY EXAM CHEST 1 VIEW: CPT

## 2023-06-30 PROCEDURE — 80053 COMPREHEN METABOLIC PANEL: CPT

## 2023-06-30 PROCEDURE — 93005 ELECTROCARDIOGRAM TRACING: CPT | Performed by: EMERGENCY MEDICINE

## 2023-06-30 RX ORDER — LORAZEPAM 2 MG/ML
2 INJECTION INTRAMUSCULAR ONCE
Status: COMPLETED | OUTPATIENT
Start: 2023-06-30 | End: 2023-06-30

## 2023-06-30 RX ADMIN — THIAMINE HYDROCHLORIDE 1000 ML: 100 INJECTION, SOLUTION INTRAMUSCULAR; INTRAVENOUS at 21:41

## 2023-06-30 RX ADMIN — LORAZEPAM 2 MG: 2 INJECTION INTRAMUSCULAR; INTRAVENOUS at 21:42

## 2023-06-30 ASSESSMENT — HEART SCORE
ECG: NON-SPECIFIC REPOLARIZATION DISTURBANCE
RISK FACTORS: NO KNOWN RISK FACTORS
HISTORY: SLIGHTLY SUSPICIOUS
HEART SCORE: 1
TROPONIN: LESS THAN OR EQUAL TO NORMAL LIMIT
AGE: <45

## 2023-06-30 ASSESSMENT — LIFESTYLE VARIABLES
NAUSEA AND VOMITING: NO NAUSEA AND NO VOMITING
HEADACHE, FULLNESS IN HEAD: NOT PRESENT
ANXIETY: *
AGITATION: NORMAL ACTIVITY
AUDITORY DISTURBANCES: NOT PRESENT
TOTAL SCORE: 3
VISUAL DISTURBANCES: NOT PRESENT
PAROXYSMAL SWEATS: NO SWEAT VISIBLE
TREMOR: NO TREMOR
ORIENTATION AND CLOUDING OF SENSORIUM: CANNOT DO SERIAL ADDITIONS OR IS UNCERTAIN ABOUT DATE

## 2023-07-01 VITALS
SYSTOLIC BLOOD PRESSURE: 102 MMHG | HEART RATE: 76 BPM | TEMPERATURE: 98.2 F | RESPIRATION RATE: 18 BRPM | OXYGEN SATURATION: 94 % | DIASTOLIC BLOOD PRESSURE: 69 MMHG

## 2023-07-01 RX ORDER — TRAZODONE HYDROCHLORIDE 50 MG/1
50 TABLET ORAL
Status: SHIPPED | COMMUNITY
Start: 2023-06-26 | End: 2023-09-21 | Stop reason: SDUPTHER

## 2023-07-01 RX ORDER — PAROXETINE HYDROCHLORIDE 20 MG/1
20 TABLET, FILM COATED ORAL EVERY EVENING
Status: SHIPPED | COMMUNITY
Start: 2023-06-17 | End: 2023-09-21

## 2023-07-01 ASSESSMENT — LIFESTYLE VARIABLES
ANXIETY: NO ANXIETY (AT EASE)
HEADACHE, FULLNESS IN HEAD: NOT PRESENT
PAROXYSMAL SWEATS: NO SWEAT VISIBLE
TREMOR: NO TREMOR
NAUSEA AND VOMITING: NO NAUSEA AND NO VOMITING
AGITATION: NORMAL ACTIVITY
TOTAL SCORE: 0
AUDITORY DISTURBANCES: NOT PRESENT
ORIENTATION AND CLOUDING OF SENSORIUM: ORIENTED AND CAN DO SERIAL ADDITIONS
VISUAL DISTURBANCES: NOT PRESENT

## 2023-07-01 NOTE — ED NOTES
Checked on bed, connected to monitor,  with unlabored respirations. Gurney in low position, side rail up for pt safety. Call light within reach. Will continue to monitor. Seizure precaution and fall interventions in place.

## 2023-07-01 NOTE — ED TRIAGE NOTES
Chief Complaint   Patient presents with    Seizure     X 1 episode, witnessed by EMS        BIB EMS to [facility] picked up from home, on scene patient was having seizures, as per EMS patient hit her head in bath tub while seizing, no prior history of seizures, no use of blood thinner. Patient endorses she drinks 1 pint of vodka every day , last use at 3 pm today, patient is alert but confuse in conversation, on room air respirations even and unlabored.      Pt on monitor , side rails paded, seizures and fall precautions initiated. Labs drawn and sent.     Medications given en route: none    /60   Pulse 99   Temp 37.3 °C (99.1 °F) (Temporal)   Resp 18   SpO2 92%      rapidly rising Cr 2/T dye load and diuresis, trending better

## 2023-07-01 NOTE — ED NOTES
Pt discharged. GCS 15. IV discontinued and gauze placed, pt in possession of belongings. Pt provided discharge education and information pertaining to medications and follow up appointments. Pt received copy of discharge instructions and verbalized understanding.     Vitals:    07/01/23 0503   BP: 102/69   Pulse: 76   Resp: 18   Temp: 36.8 °C (98.2 °F)   SpO2: 94%

## 2023-07-01 NOTE — DISCHARGE SUMMARY
ED Observation Discharge Summary    Patient:Johanne Raya  Patient : 1980  Patient MRN: [id number]  Patient PCP: Pcp Pt States None    Admit Date: 2023  Discharge Date and Time: 23 5:11 AM  Discharge Diagnosis: Alcohol withdrawal seizures alcohol abuse acute alcohol intoxication  Discharge Attending: Cullen Winn D.O.  Discharge Service: ED Observation    ED Course  Johanne is a 43 y.o. female who was evaluated at [facility] evaluation of seizures.  Patient does have a history of alcohol abuse has recently tried to decrease her alcohol intake.  Drinking roughly half being her regular alcohol intake and had a witnessed tonic-clonic seizure this evening.  Laboratory evaluation in the emergency department did reveal a significantly elevated alcohol, as well as mildly elevated lipase though no significant abdominal tenderness lowering concern for underlying pancreatitis her CT head was negative she was placed into ED observation pending clinical sobriety for possible admission for acute detox.  She was observed in the emergency department for 9-1/2 hours and upon my assessment was awake alert she was answering questions appropriately she had no signs of alcohol withdrawal though I strongly recommended admission for detox given the likely underlying alcohol withdrawal seizure this evening.  However the patient expressed no interest in quitting drinking stating that she desired to continue drinking.  The risk benefits alternatives were discussed the patient did have capacity to decline admission.  I explained her that alcohol withdrawals could be life-threatening and encouraged her to return to the ER with any returning or worsening signs of alcohol withdrawal, given that she plans on continuing to drink do not feel comfortable sending the patient with a Librium prescription.  Encouraged her to follow-up with outpatient detox resources or return with worsening signs of alcohol  withdrawal.    Discharge Exam:  BP 99/73   Pulse 76   Temp 37.3 °C (99.1 °F) (Temporal)   Resp 15   SpO2 94% .    Constitutional: Awake and alert. Nontoxic  HENT:  Grossly normal  Eyes: Grossly normal  Neck: Normal range of motion  Cardiovascular: Normal heart rate   Thorax & Lungs: No respiratory distress  Abdomen: Nontender  Skin:  No pathologic rash.   Extremities: Well perfused  Psychiatric: Affect normal    Labs  Results for orders placed or performed during the hospital encounter of 06/30/23   CBC WITH DIFFERENTIAL   Result Value Ref Range    WBC 10.3 4.8 - 10.8 K/uL    RBC 4.57 4.20 - 5.40 M/uL    Hemoglobin 14.9 12.0 - 16.0 g/dL    Hematocrit 42.8 37.0 - 47.0 %    MCV 93.7 81.4 - 97.8 fL    MCH 32.6 27.0 - 33.0 pg    MCHC 34.8 32.2 - 35.5 g/dL    RDW 45.5 35.9 - 50.0 fL    Platelet Count 354 164 - 446 K/uL    MPV 9.2 9.0 - 12.9 fL    Neutrophils-Polys 59.40 44.00 - 72.00 %    Lymphocytes 31.30 22.00 - 41.00 %    Monocytes 5.80 0.00 - 13.40 %    Eosinophils 2.40 0.00 - 6.90 %    Basophils 0.60 0.00 - 1.80 %    Immature Granulocytes 0.50 0.00 - 0.90 %    Nucleated RBC 0.00 0.00 - 0.20 /100 WBC    Neutrophils (Absolute) 6.10 1.82 - 7.42 K/uL    Lymphs (Absolute) 3.22 1.00 - 4.80 K/uL    Monos (Absolute) 0.60 0.00 - 0.85 K/uL    Eos (Absolute) 0.25 0.00 - 0.51 K/uL    Baso (Absolute) 0.06 0.00 - 0.12 K/uL    Immature Granulocytes (abs) 0.05 0.00 - 0.11 K/uL    NRBC (Absolute) 0.00 K/uL   COMP METABOLIC PANEL   Result Value Ref Range    Sodium 144 135 - 145 mmol/L    Potassium 3.7 3.6 - 5.5 mmol/L    Chloride 107 96 - 112 mmol/L    Co2 19 (L) 20 - 33 mmol/L    Anion Gap 18.0 (H) 7.0 - 16.0    Glucose 81 65 - 99 mg/dL    Bun 30 (H) 8 - 22 mg/dL    Creatinine 0.66 0.50 - 1.40 mg/dL    Calcium 9.1 8.5 - 10.5 mg/dL    AST(SGOT) 21 12 - 45 U/L    ALT(SGPT) 28 2 - 50 U/L    Alkaline Phosphatase 64 30 - 99 U/L    Total Bilirubin 0.2 0.1 - 1.5 mg/dL    Albumin 4.2 3.2 - 4.9 g/dL    Total Protein 6.8 6.0 - 8.2 g/dL     Globulin 2.6 1.9 - 3.5 g/dL    A-G Ratio 1.6 g/dL   LIPASE   Result Value Ref Range    Lipase 157 (H) 11 - 82 U/L   PROTHROMBIN TIME   Result Value Ref Range    PT 14.1 12.0 - 14.6 sec    INR 1.10 0.87 - 1.13   APTT   Result Value Ref Range    APTT 32.9 24.7 - 36.0 sec   TROPONIN   Result Value Ref Range    Troponin T <6 6 - 19 ng/L   DIAGNOSTIC ALCOHOL   Result Value Ref Range    Diagnostic Alcohol 209.3 (H) <10.1 mg/dL   MAGNESIUM   Result Value Ref Range    Magnesium 2.2 1.5 - 2.5 mg/dL   CORRECTED CALCIUM   Result Value Ref Range    Correct Calcium 8.9 8.5 - 10.5 mg/dL   ESTIMATED GFR   Result Value Ref Range    GFR (CKD-EPI) 111 >60 mL/min/1.73 m 2   EKG (NOW)   Result Value Ref Range    Report       [facility] Emergency Dept.    Test Date:  2023  Pt Name:    RODOLFO CANTU               Department: ER  MRN:        [id number]                      Room:       [facility]  Gender:     Female                       Technician: [id number]  :        1980                   Requested By:SERINA DEE  Order #:    [id number]                    Reading MD: SERINA DEE MD    Measurements  Intervals                                Axis  Rate:       90                           P:          77  OK:         127                          QRS:        20  QRSD:       84                           T:          52  QT:         375  QTc:        459    Interpretive Statements  Sinus rhythm  Borderline low voltage, extremity leads  Compared to ECG 07/15/2022 21:56:47  T-wave abnormality no longer present  Electronically Signed On 2023 23:54:28 PDT by SERINA DEE MD         Radiology  CT-HEAD W/O   Final Result         1. No acute intracranial abnormality. No evidence of acute intracranial hemorrhage or mass lesion.                     DX-CHEST-PORTABLE (1 VIEW)   Final Result         1. No acute cardiopulmonary abnormalities are identified.          Medications:   New Prescriptions    No medications on file        My final assessment includes alcohol withdrawal seizure alcohol abuse alcohol intoxication  Upon Reevaluation, the patient's condition has: Improved; and will be discharged.    Patient discharged from ED Observation status at 0606 (Time) 7/1 (Date).     Total time spent on this ED Observation discharge encounter is < 30 Minutes    Electronically signed by: Cullen Winn D.O., 7/1/2023 5:11 AM

## 2023-07-01 NOTE — ED NOTES
Checked on bed, connected to monitor,  with unlabored respirations. Gurney in low position, side rail up for pt safety. Call light within reach. Will continue to monitor.

## 2023-07-01 NOTE — ED PROVIDER NOTES
ED Provider Note    CHIEF COMPLAINT  Chief Complaint   Patient presents with    Seizure     X 1 episode, witnessed by EMS        EXTERNAL RECORDS REVIEWED  Outpatient Notes patient was seen at [facility] 2023 for anxiety    HPI/ROS  LIMITATION TO HISTORY   Select: Intoxication  OUTSIDE HISTORIAN(S):  Significant other states that they had been drinking alcohol this evening and she went to the bathroom and he found her on the floor seizing and unconscious.  He states that she was foaming at the mouth working all of her extremities    Johanne Raya is a 43 y.o. female who presents by ambulance after having seizure-like activity at home and in the ambulance.  Does not really remember anything.  She states that she and her significant other were drinking this evening and she remembers waking up in the ambulance.  Patient admits to daily drinking.  She states she has decreased her alcohol intake over the last week.  He has never had seizures before.  She did bump her head and is complaining some pain in the back of her head.  She denies any chest pains or shortness of breath neck pain or extremity weakness.  She does not feel shaky.  Denies illicit drug use.  She does smoke cigarettes.    PAST MEDICAL HISTORY   has a past medical history of Anxiety and depression and Snoring.    SURGICAL HISTORY   has a past surgical history that includes appendectomy (); tubal ligation (); primary c section (); other orthopedic surgery (Left, 2010); tonsillectomy; and lap, jalen restrict proc, longitudinal gas* (9/15/2021).    FAMILY HISTORY  No family history on file.    SOCIAL HISTORY  Social History     Tobacco Use    Smoking status: Every Day     Packs/day: 1.00     Years: 16.00     Pack years: 16.00     Types: Cigarettes     Last attempt to quit: 2021     Years since quittin.8    Smokeless tobacco: Never   Vaping Use    Vaping Use: Never used   Substance and Sexual Activity    Alcohol use:  Yes     Comment: when able to drink consumes 1/5    Drug use: Not Currently     Types: Inhaled     Comment: marijuana    Sexual activity: Not on file       CURRENT MEDICATIONS  Home Medications       Reviewed by Soumya Lucio R.N. (Registered Nurse) on 06/30/23 at 1947  Med List Status: Partial     Medication Last Dose Status   acetaminophen (TYLENOL) 500 MG Tab  Active   hydrOXYzine HCl (ATARAX) 25 MG Tab  Active   Multiple Vitamin (MULTI-VITAMIN DAILY PO)  Active   nicotine polacrilex (NICORETTE) 2 MG Gum  Active   NICOTINE TD  Active   NON SPECIFIED  Active   Pediatric Multivit-Minerals-C (CHEWABLES MULTIVITAMIN PO)  Active                    ALLERGIES  Allergies   Allergen Reactions    Nsaids      Other reaction(s): GI bleeding    Penicillins Rash       PHYSICAL EXAM  VITAL SIGNS: /60   Pulse 99   Temp 37.3 °C (99.1 °F) (Temporal)   Resp 18   SpO2 92%      Constitutional: Well developed, Well nourished, No acute distress, Non-toxic appearance.   HEENT: Normocephalic, Atraumatic,  external ears normal, pharynx pink,  Mucous  Membranes moist, No rhinorrhea or mucosal edema  Eyes: PERRL, EOMI, Conjunctiva normal, No discharge.   Neck: Normal range of motion, No tenderness, Supple, No stridor.   Lymphatic: No lymphadenopathy    Cardiovascular: Regular Rate and Rhythm, No murmurs,  rubs, or gallops.   Thorax & Lungs: Lungs clear to auscultation bilaterally, No respiratory distress, No wheezes, rhales or rhonchi, No chest wall tenderness.   Abdomen: Bowel sounds normal, Soft, non tender, non distended,  No pulsatile masses., no rebound guarding or peritoneal signs.   Skin: Warm, Dry, No erythema, No rash, no jaundice  Back:  No CVA tenderness,  No spinal tenderness, bony crepitance step offs or instability.   Extremities: Equal, intact distal pulses, No cyanosis, clubbing or edema,  No tenderness.   Musculoskeletal: Good range of motion in all major joints. No tenderness to palpation or major deformities  noted.   Neurologic: Alert & oriented x 3, Cranial nerves II-XII intact, Equal strength and sensation upper and lower extremities bilaterally,  No focal deficits noted.  No asterixis  Psychiatric: Affect normal, Judgment normal, Mood normal.       DIAGNOSTIC STUDIES / PROCEDURES  EKG  I have independently interpreted this EKG  See below    LABS  Results for orders placed or performed during the hospital encounter of 06/30/23   CBC WITH DIFFERENTIAL   Result Value Ref Range    WBC 10.3 4.8 - 10.8 K/uL    RBC 4.57 4.20 - 5.40 M/uL    Hemoglobin 14.9 12.0 - 16.0 g/dL    Hematocrit 42.8 37.0 - 47.0 %    MCV 93.7 81.4 - 97.8 fL    MCH 32.6 27.0 - 33.0 pg    MCHC 34.8 32.2 - 35.5 g/dL    RDW 45.5 35.9 - 50.0 fL    Platelet Count 354 164 - 446 K/uL    MPV 9.2 9.0 - 12.9 fL    Neutrophils-Polys 59.40 44.00 - 72.00 %    Lymphocytes 31.30 22.00 - 41.00 %    Monocytes 5.80 0.00 - 13.40 %    Eosinophils 2.40 0.00 - 6.90 %    Basophils 0.60 0.00 - 1.80 %    Immature Granulocytes 0.50 0.00 - 0.90 %    Nucleated RBC 0.00 0.00 - 0.20 /100 WBC    Neutrophils (Absolute) 6.10 1.82 - 7.42 K/uL    Lymphs (Absolute) 3.22 1.00 - 4.80 K/uL    Monos (Absolute) 0.60 0.00 - 0.85 K/uL    Eos (Absolute) 0.25 0.00 - 0.51 K/uL    Baso (Absolute) 0.06 0.00 - 0.12 K/uL    Immature Granulocytes (abs) 0.05 0.00 - 0.11 K/uL    NRBC (Absolute) 0.00 K/uL   COMP METABOLIC PANEL   Result Value Ref Range    Sodium 144 135 - 145 mmol/L    Potassium 3.7 3.6 - 5.5 mmol/L    Chloride 107 96 - 112 mmol/L    Co2 19 (L) 20 - 33 mmol/L    Anion Gap 18.0 (H) 7.0 - 16.0    Glucose 81 65 - 99 mg/dL    Bun 30 (H) 8 - 22 mg/dL    Creatinine 0.66 0.50 - 1.40 mg/dL    Calcium 9.1 8.5 - 10.5 mg/dL    AST(SGOT) 21 12 - 45 U/L    ALT(SGPT) 28 2 - 50 U/L    Alkaline Phosphatase 64 30 - 99 U/L    Total Bilirubin 0.2 0.1 - 1.5 mg/dL    Albumin 4.2 3.2 - 4.9 g/dL    Total Protein 6.8 6.0 - 8.2 g/dL    Globulin 2.6 1.9 - 3.5 g/dL    A-G Ratio 1.6 g/dL   LIPASE   Result Value Ref  Range    Lipase 157 (H) 11 - 82 U/L   PROTHROMBIN TIME   Result Value Ref Range    PT 14.1 12.0 - 14.6 sec    INR 1.10 0.87 - 1.13   APTT   Result Value Ref Range    APTT 32.9 24.7 - 36.0 sec   TROPONIN   Result Value Ref Range    Troponin T <6 6 - 19 ng/L   DIAGNOSTIC ALCOHOL   Result Value Ref Range    Diagnostic Alcohol 209.3 (H) <10.1 mg/dL   MAGNESIUM   Result Value Ref Range    Magnesium 2.2 1.5 - 2.5 mg/dL   CORRECTED CALCIUM   Result Value Ref Range    Correct Calcium 8.9 8.5 - 10.5 mg/dL   ESTIMATED GFR   Result Value Ref Range    GFR (CKD-EPI) 111 >60 mL/min/1.73 m 2   EKG (NOW)   Result Value Ref Range    Report       [facility] Emergency Dept.    Test Date:  2023  Pt Name:    RODOLFO CANTU               Department: ER  MRN:        [id number]                      Room:       [facility]  Gender:     Female                       Technician: [id number]  :        1980                   Requested By:SERINA DEE  Order #:    [id number]                    Reading MD: SERINA DEE MD    Measurements  Intervals                                Axis  Rate:       90                           P:          77  MI:         127                          QRS:        20  QRSD:       84                           T:          52  QT:         375  QTc:        459    Interpretive Statements  Sinus rhythm  Borderline low voltage, extremity leads  Compared to ECG 07/15/2022 21:56:47  T-wave abnormality no longer present  Electronically Signed On 2023 23:54:28 PDT by SERINA DEE MD           RADIOLOGY  I have independently interpreted the diagnostic imaging associated with this visit and am waiting the final reading from the radiologist.   My preliminary interpretation is as follows: ct head w/o no skull fracture or intracranial hemorrhage  Radiologist interpretation:   CT-HEAD W/O   Final Result         1. No acute intracranial abnormality. No evidence of acute intracranial hemorrhage or mass lesion.                      DX-CHEST-PORTABLE (1 VIEW)   Final Result         1. No acute cardiopulmonary abnormalities are identified.           COURSE & MEDICAL DECISION MAKING    ED Observation Status? Yes; I am placing the patient in to an observation status due to a diagnostic uncertainty as well as therapeutic intensity. Patient placed in observation status at 8:18 PM, 6/30/2023.     Observation plan is as follows: Detox bag alcohol level IV Ativan and lab work observation for further seizures        INITIAL ASSESSMENT, COURSE AND PLAN  Care Narrative: This is a 43-year-old female who is a chronic alcoholic who has been cutting back on her alcohol intake over the last week.  Tonight even though she was drinking she had a seizure in the bathroom that was tonic-clonic in nature and she hit the back of her head.  Currently she is alert and awake and is amnestic to the entire event.  She is complaining of a slight headache but is neurologically otherwise normal.  I have ordered labs CT head seizure precautions and a detox bag as well as IV Ativan to further evaluate this patient's condition  HYDRATION: Based on the patient's presentation of CIWA the patient was given IV fluids. IV Hydration was used because oral hydration was not adequate alone. Upon recheck following hydration, the patient was improved.   Differential diagnosis: Alcohol withdrawal seizure, closed head injury, intracranial hemorrhage, electrolyte disturbance, arrhythmia, anemia, dehydration, hypoglycemia  ADDITIONAL PROBLEM LIST  Alcohol abuse  Anxiety  DISPOSITION AND DISCUSSIONS      The patient's blood alcohol level is 209.3.  Coags are normal.  [facility] metabolic panel has a CO2 of 19 anion gap of 18 with a glucose of 81 BUN is 30 creatinine 1.66.  Lipase is slight elevated at 157.  Troponin is less than 6 and her EKG shows nonspecific changes giving her a heart score of 1.  CBC is unremarkable.  Upon recheck the patient is sleeping comfortably  after Ativan and a detox bag.  We will wait for her to become more sober and alert and then discuss the possibility of admitting her for alcohol detox and withdrawal.  I explained to her significant other the danger of alcohol withdrawal seizures and how withdrawing from alcohol can kill you without medical help.  The patient significant understands and is fine with keeping her here until she is awake and able to make her own decision about whether she wants to be admitted for alcohol detox or discharged home.  I have discussed management of the patient with the following physicians and CAMILA's:  none    Discussion of management with other QHP or appropriate source(s): None     Escalation of care considered, and ultimately not performed: I will be happy to admit the patient to the hospital for alcohol detox if she wants to do so however right now she is too sleepy to make that decision    Barriers to care at this time, including but not limited to: Patient does not have established PCP.     Decision tools and prescription drugs considered including, but not limited to: heart score 1.    FINAL DIAGNOSIS  1. Alcohol withdrawal seizure without complication (HCC)    2. Alcohol abuse           Electronically signed by: Mary Barrow M.D., 6/30/2023 8:17 PM

## 2023-07-01 NOTE — ED NOTES
Med rec completed per dispense report from home pharmacy ([facility])  Patient unable to wake for interview  Unknown last doses  Unable to assess allergies at this time

## 2023-07-01 NOTE — DISCHARGE INSTRUCTIONS
He had an alcohol withdrawal seizure alcohol withdrawals can be life-threatening if you wish to be admitted for detox, develop any severe shaking and hallucinations another seizure you should return to the ER right away follow-up with outpatient detox resources return with other concerns

## 2023-09-20 SDOH — HEALTH STABILITY: PHYSICAL HEALTH: ON AVERAGE, HOW MANY DAYS PER WEEK DO YOU ENGAGE IN MODERATE TO STRENUOUS EXERCISE (LIKE A BRISK WALK)?: 0 DAYS

## 2023-09-20 SDOH — ECONOMIC STABILITY: HOUSING INSECURITY
IN THE LAST 12 MONTHS, WAS THERE A TIME WHEN YOU DID NOT HAVE A STEADY PLACE TO SLEEP OR SLEPT IN A SHELTER (INCLUDING NOW)?: NO

## 2023-09-20 SDOH — ECONOMIC STABILITY: HOUSING INSECURITY: IN THE LAST 12 MONTHS, HOW MANY PLACES HAVE YOU LIVED?: 1

## 2023-09-20 SDOH — ECONOMIC STABILITY: INCOME INSECURITY: IN THE LAST 12 MONTHS, WAS THERE A TIME WHEN YOU WERE NOT ABLE TO PAY THE MORTGAGE OR RENT ON TIME?: NO

## 2023-09-20 SDOH — ECONOMIC STABILITY: TRANSPORTATION INSECURITY
IN THE PAST 12 MONTHS, HAS LACK OF TRANSPORTATION KEPT YOU FROM MEETINGS, WORK, OR FROM GETTING THINGS NEEDED FOR DAILY LIVING?: NO

## 2023-09-20 SDOH — HEALTH STABILITY: PHYSICAL HEALTH: ON AVERAGE, HOW MANY MINUTES DO YOU ENGAGE IN EXERCISE AT THIS LEVEL?: 0 MIN

## 2023-09-20 SDOH — ECONOMIC STABILITY: TRANSPORTATION INSECURITY
IN THE PAST 12 MONTHS, HAS THE LACK OF TRANSPORTATION KEPT YOU FROM MEDICAL APPOINTMENTS OR FROM GETTING MEDICATIONS?: NO

## 2023-09-20 SDOH — HEALTH STABILITY: MENTAL HEALTH
STRESS IS WHEN SOMEONE FEELS TENSE, NERVOUS, ANXIOUS, OR CAN'T SLEEP AT NIGHT BECAUSE THEIR MIND IS TROUBLED. HOW STRESSED ARE YOU?: VERY MUCH

## 2023-09-20 SDOH — ECONOMIC STABILITY: FOOD INSECURITY: WITHIN THE PAST 12 MONTHS, YOU WORRIED THAT YOUR FOOD WOULD RUN OUT BEFORE YOU GOT MONEY TO BUY MORE.: PATIENT DECLINED

## 2023-09-20 SDOH — ECONOMIC STABILITY: TRANSPORTATION INSECURITY
IN THE PAST 12 MONTHS, HAS LACK OF RELIABLE TRANSPORTATION KEPT YOU FROM MEDICAL APPOINTMENTS, MEETINGS, WORK OR FROM GETTING THINGS NEEDED FOR DAILY LIVING?: NO

## 2023-09-20 SDOH — ECONOMIC STABILITY: FOOD INSECURITY: WITHIN THE PAST 12 MONTHS, THE FOOD YOU BOUGHT JUST DIDN'T LAST AND YOU DIDN'T HAVE MONEY TO GET MORE.: PATIENT DECLINED

## 2023-09-20 SDOH — ECONOMIC STABILITY: INCOME INSECURITY: HOW HARD IS IT FOR YOU TO PAY FOR THE VERY BASICS LIKE FOOD, HOUSING, MEDICAL CARE, AND HEATING?: PATIENT DECLINED

## 2023-09-20 ASSESSMENT — LIFESTYLE VARIABLES
HOW OFTEN DO YOU HAVE SIX OR MORE DRINKS ON ONE OCCASION: PATIENT DECLINED
HOW MANY STANDARD DRINKS CONTAINING ALCOHOL DO YOU HAVE ON A TYPICAL DAY: 10 OR MORE
SKIP TO QUESTIONS 9-10: 0
AUDIT-C TOTAL SCORE: -1
HOW OFTEN DO YOU HAVE A DRINK CONTAINING ALCOHOL: PATIENT DECLINED

## 2023-09-20 ASSESSMENT — SOCIAL DETERMINANTS OF HEALTH (SDOH)
HOW OFTEN DO YOU HAVE A DRINK CONTAINING ALCOHOL: PATIENT DECLINED
IN A TYPICAL WEEK, HOW MANY TIMES DO YOU TALK ON THE PHONE WITH FAMILY, FRIENDS, OR NEIGHBORS?: MORE THAN THREE TIMES A WEEK
HOW MANY DRINKS CONTAINING ALCOHOL DO YOU HAVE ON A TYPICAL DAY WHEN YOU ARE DRINKING: 10 OR MORE
DO YOU BELONG TO ANY CLUBS OR ORGANIZATIONS SUCH AS CHURCH GROUPS UNIONS, FRATERNAL OR ATHLETIC GROUPS, OR SCHOOL GROUPS?: NO
HOW OFTEN DO YOU ATTENT MEETINGS OF THE CLUB OR ORGANIZATION YOU BELONG TO?: MORE THAN 4 TIMES PER YEAR
WITHIN THE PAST 12 MONTHS, YOU WORRIED THAT YOUR FOOD WOULD RUN OUT BEFORE YOU GOT THE MONEY TO BUY MORE: PATIENT DECLINED
HOW OFTEN DO YOU ATTENT MEETINGS OF THE CLUB OR ORGANIZATION YOU BELONG TO?: MORE THAN 4 TIMES PER YEAR
HOW OFTEN DO YOU HAVE SIX OR MORE DRINKS ON ONE OCCASION: PATIENT DECLINED
HOW OFTEN DO YOU GET TOGETHER WITH FRIENDS OR RELATIVES?: PATIENT DECLINED
HOW OFTEN DO YOU GET TOGETHER WITH FRIENDS OR RELATIVES?: PATIENT DECLINED
IN A TYPICAL WEEK, HOW MANY TIMES DO YOU TALK ON THE PHONE WITH FAMILY, FRIENDS, OR NEIGHBORS?: MORE THAN THREE TIMES A WEEK
HOW OFTEN DO YOU ATTEND CHURCH OR RELIGIOUS SERVICES?: PATIENT DECLINED
DO YOU BELONG TO ANY CLUBS OR ORGANIZATIONS SUCH AS CHURCH GROUPS UNIONS, FRATERNAL OR ATHLETIC GROUPS, OR SCHOOL GROUPS?: NO
HOW HARD IS IT FOR YOU TO PAY FOR THE VERY BASICS LIKE FOOD, HOUSING, MEDICAL CARE, AND HEATING?: PATIENT DECLINED
HOW OFTEN DO YOU ATTEND CHURCH OR RELIGIOUS SERVICES?: PATIENT DECLINED

## 2023-09-21 ENCOUNTER — OFFICE VISIT (OUTPATIENT)
Dept: MEDICAL GROUP | Facility: CLINIC | Age: 43
End: 2023-09-21
Payer: COMMERCIAL

## 2023-09-21 VITALS
HEART RATE: 83 BPM | BODY MASS INDEX: 22.26 KG/M2 | RESPIRATION RATE: 16 BRPM | DIASTOLIC BLOOD PRESSURE: 80 MMHG | SYSTOLIC BLOOD PRESSURE: 118 MMHG | WEIGHT: 121 LBS | OXYGEN SATURATION: 99 % | HEIGHT: 62 IN

## 2023-09-21 DIAGNOSIS — R73.03 PREDIABETES: ICD-10-CM

## 2023-09-21 DIAGNOSIS — F10.21 HISTORY OF ALCOHOLISM (HCC): ICD-10-CM

## 2023-09-21 DIAGNOSIS — Z00.00 ROUTINE ADULT HEALTH MAINTENANCE: ICD-10-CM

## 2023-09-21 DIAGNOSIS — F41.9 ANXIETY: ICD-10-CM

## 2023-09-21 LAB
HBA1C MFR BLD: 5.6 % (ref ?–5.8)
POCT INT CON NEG: NEGATIVE
POCT INT CON POS: POSITIVE

## 2023-09-21 PROCEDURE — 99213 OFFICE O/P EST LOW 20 MIN: CPT | Mod: GE

## 2023-09-21 PROCEDURE — 83036 HEMOGLOBIN GLYCOSYLATED A1C: CPT

## 2023-09-21 PROCEDURE — 3074F SYST BP LT 130 MM HG: CPT

## 2023-09-21 PROCEDURE — 3079F DIAST BP 80-89 MM HG: CPT

## 2023-09-21 RX ORDER — HYDROXYZINE HYDROCHLORIDE 25 MG/1
25 TABLET, FILM COATED ORAL 3 TIMES DAILY PRN
Qty: 30 TABLET | Refills: 3 | Status: SHIPPED | OUTPATIENT
Start: 2023-09-21 | End: 2023-12-19

## 2023-09-21 RX ORDER — TRAZODONE HYDROCHLORIDE 50 MG/1
50 TABLET ORAL
Qty: 30 TABLET | Refills: 3 | Status: SHIPPED | OUTPATIENT
Start: 2023-09-21 | End: 2023-10-15

## 2023-09-21 RX ORDER — BUSPIRONE HYDROCHLORIDE 7.5 MG/1
7.5 TABLET ORAL 2 TIMES DAILY
Qty: 100 TABLET | Refills: 1 | Status: SHIPPED | OUTPATIENT
Start: 2023-09-21 | End: 2023-10-15

## 2023-09-21 ASSESSMENT — ANXIETY QUESTIONNAIRES
6. BECOMING EASILY ANNOYED OR IRRITABLE: NEARLY EVERY DAY
2. NOT BEING ABLE TO STOP OR CONTROL WORRYING: NEARLY EVERY DAY
3. WORRYING TOO MUCH ABOUT DIFFERENT THINGS: NEARLY EVERY DAY
1. FEELING NERVOUS, ANXIOUS, OR ON EDGE: NEARLY EVERY DAY
5. BEING SO RESTLESS THAT IT IS HARD TO SIT STILL: NEARLY EVERY DAY
4. TROUBLE RELAXING: NEARLY EVERY DAY
GAD7 TOTAL SCORE: 21
7. FEELING AFRAID AS IF SOMETHING AWFUL MIGHT HAPPEN: NEARLY EVERY DAY

## 2023-09-21 ASSESSMENT — PATIENT HEALTH QUESTIONNAIRE - PHQ9: CLINICAL INTERPRETATION OF PHQ2 SCORE: 0

## 2023-09-21 ASSESSMENT — FIBROSIS 4 INDEX: FIB4 SCORE: 0.49

## 2023-09-21 NOTE — ASSESSMENT & PLAN NOTE
Patient does appear notably anxious on encounter.  EDEL today with max score of 21.  Explained to patient that there are a few options that we can start with (Lexapro, increasing Paxil, buspirone (and, patient chose to stop Paxil and switch to buspirone.  - Starting buspirone-we will start 7.5 for 3 days, then increase to 7.5 twice daily, then patient may increase to up to 15 mg twice daily if needed.  - I would like to see patient again in about a month to follow-up on response  - If patient continues to have no response to anxiolytics, will to evaluate for possible personality disorderplan to send patient back to psychiatry and possible benefit from mood stabilizer.  - Refilled hydroxyzine and trazodone.  - Recommended patient pursue counseling option at work, patient declines therapy referral at this time.  -At future visit, can consider giving patient leave of absence for work, specifically if she is going to start therapy or see psychiatry.

## 2023-09-21 NOTE — ASSESSMENT & PLAN NOTE
- Continue AA meetings  - We will consider naltrexone in the future if patient would like this or if she starts drinking again  - This also would be a good outlet for her to start therapy for when amenable.

## 2023-09-21 NOTE — ASSESSMENT & PLAN NOTE
Point-of-care A1c was 5.6 today, 2 years ago was 5.1.  - Talk about lifestyle, specifically for patient is cutting back on eating frosting, she will aim to cut back to half a jar per night.   -Consider rechecking A1c and next visit and if in more than 3 months.
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
## 2023-10-13 ENCOUNTER — TELEPHONE (OUTPATIENT)
Dept: MEDICAL GROUP | Facility: CLINIC | Age: 43
End: 2023-10-13

## 2023-10-13 NOTE — TELEPHONE ENCOUNTER
Dr. Arvizu your patient Johanne Raya called and needs her Buspirone (Buspar) 7.5mg and per patient takes 2 tabs in AM and 2 tabs in PM now and is out of meds. Thank you and it is [facility]. Patients phone number is [phone]

## 2023-10-15 RX ORDER — BUSPIRONE HYDROCHLORIDE 15 MG/1
15 TABLET ORAL 2 TIMES DAILY
Qty: 60 TABLET | Refills: 3 | Status: SHIPPED | OUTPATIENT
Start: 2023-10-15 | End: 2023-10-16

## 2023-10-16 RX ORDER — BUSPIRONE HYDROCHLORIDE 15 MG/1
15 TABLET ORAL 2 TIMES DAILY
Qty: 180 TABLET | Refills: 2 | Status: SHIPPED | OUTPATIENT
Start: 2023-10-16 | End: 2023-11-21

## 2023-10-19 ENCOUNTER — OFFICE VISIT (OUTPATIENT)
Dept: MEDICAL GROUP | Facility: CLINIC | Age: 43
End: 2023-10-19
Payer: COMMERCIAL

## 2023-10-19 VITALS
SYSTOLIC BLOOD PRESSURE: 124 MMHG | TEMPERATURE: 97.4 F | HEIGHT: 62 IN | BODY MASS INDEX: 23.45 KG/M2 | HEART RATE: 74 BPM | WEIGHT: 127.4 LBS | DIASTOLIC BLOOD PRESSURE: 74 MMHG | OXYGEN SATURATION: 97 %

## 2023-10-19 DIAGNOSIS — F41.9 ANXIETY: ICD-10-CM

## 2023-10-19 PROCEDURE — 99213 OFFICE O/P EST LOW 20 MIN: CPT | Mod: GE

## 2023-10-19 PROCEDURE — 3078F DIAST BP <80 MM HG: CPT

## 2023-10-19 PROCEDURE — 3074F SYST BP LT 130 MM HG: CPT

## 2023-10-19 ASSESSMENT — FIBROSIS 4 INDEX: FIB4 SCORE: 0.49

## 2023-10-19 NOTE — PROGRESS NOTES
[facility]     PATIENT ID:  NAME:  Johanne Raya  MRN:               [id number]  YOB: 1980    Date: 9:30 AM      Resident: Wade Del Real M.D.    CC:  Anxiety      HPI: Johanne Raya is a 43 y.o. female who presented with concern for anxiety.  Patient reports that over the past few months she has been experiencing worsening anxiety only in the setting of driving a vehicle.  She states she has been unable to drive the vehicle when alone in a car during this time.  She states that her symptoms are lessened when someone else is in the vehicle with her.  She denies any inciting event, near crash, crash, new is reported of car injuries or anything she can recall setting the symptoms off.  She does note a longstanding history of anxiety and had been under the care of psychiatry in the past.  She states that Celexa and imipramine was successful for her in the past.  Currently she is on buspirone and has been titrating up her dose after her visit with Dr. Arvizu.  She states she is just got to therapeutic dosing at this point and would like to try this for the next few weeks.  She is interested in counseling as well as further consultation with psychiatry.  She also notes that she has been taking hydroxyzine without significant improvement.  Otherwise patient states she is at her baseline state of health and has no further concerns.    No problems updated.    REVIEW OF SYSTEMS:   Ten systems reviewed and were negative except as noted in the HPI.                PROBLEM LIST  Patient Active Problem List   Diagnosis    Anxiety    History of alcoholism (HCC)    Prediabetes        PAST SURGICAL HISTORY:  Past Surgical History:   Procedure Laterality Date    [place] LAP, OCTAVIA RESTRICT PROC, LONGITUDINAL GAS*  9/15/2021    Procedure: GASTRECTOMY, SLEEVE, LAPAROSCOPIC;  Surgeon: Regulo Hugo M.D.;  Location: SURGERY [facility];  Service: General    APPENDECTOMY  2014    TUBAL LIGATION  2010    OTHER  "ORTHOPEDIC SURGERY Left     foot surgery to remove extra bone    PRIMARY C SECTION          TONSILLECTOMY      as a child       FAMILY HISTORY:  No family history on file.    SOCIAL HISTORY:   Social History     Tobacco Use    Smoking status: Every Day     Current packs/day: 0.00     Average packs/day: 1 pack/day for 16.0 years (16.0 ttl pk-yrs)     Types: Cigarettes     Start date: 2005     Last attempt to quit: 2021     Years since quittin.1    Smokeless tobacco: Never   Substance Use Topics    Alcohol use: Not Currently     Comment: when able to drink consumes 1/5       ALLERGIES:  Allergies   Allergen Reactions    Nsaids      Other reaction(s): GI bleeding    Penicillins Rash       OUTPATIENT MEDICATIONS:    Current Outpatient Medications:     busPIRone (BUSPAR) 15 MG tablet, Take 1 Tablet by mouth 2 times a day., Disp: 180 Tablet, Rfl: 2    [START ON 10/20/2023] traZODone (DESYREL) 50 MG Tab, Take 1 Tablet by mouth at bedtime., Disp: 90 Tablet, Rfl: 1    hydrOXYzine HCl (ATARAX) 25 MG Tab, Take 1 Tablet by mouth 3 times a day as needed for Itching., Disp: 30 Tablet, Rfl: 3    PHYSICAL EXAM:  Vitals:    10/19/23 0829   BP: 124/74   BP Location: Left arm   Patient Position: Sitting   BP Cuff Size: Adult   Pulse: 74   Temp: 36.3 °C (97.4 °F)   TempSrc: Temporal   SpO2: 97%   Weight: 57.8 kg (127 lb 6.4 oz)   Height: 1.575 m (5' 2\")       General: Pt resting in NAD, pleasant and cooperative, calm  Skin:  Pink, warm and dry.  HEENT: NC/AT. EOMI. PERRLA  Lungs:  Symmetrical chest expansion.    Abdomen:  Abdomen is soft, nontender  Extremities:  Full range of motion.  CNS:  Muscle tone is normal. No gross focal neurologic deficits      ASSESSMENT/PLAN:   43 y.o. female who presents to clinic with concerns about anxiety.  The patient has been seen in clinic in the past for similar complaints and was started on buspirone, she has been titrating up her dose and is now at 15 mg twice daily.  " She states that she would like to continue this dose for the next few weeks to see if there is any significant improvement.  She describes her anxiety is only occurring in some areas where she is driving in a car.  She states that this has been significant and has caused her not to be able to drive alone.  She is per currently taking a short leave from work so that she can hopefully get more comfortable with driving alone.  She denies any inciting incident prior to this worsening anxiety.  She does note a past medical history that includes anxiety and was previously under the care of psychiatry, she states at that time she was well controlled with Celexa and imipramine.  We will plan to start counseling in the near future and gave patient a referral for psychology.  We will also schedule the patient for psychiatric clinic this site in the next 2 to 3 weeks.    Problem List Items Addressed This Visit       Anxiety    Relevant Orders    Referral to Psychology    TSH WITH REFLEX TO FT4       Wade Del Real M.D.  PGY-3  [facility]

## 2023-11-02 ENCOUNTER — HOSPITAL ENCOUNTER (OUTPATIENT)
Dept: LAB | Facility: MEDICAL CENTER | Age: 43
End: 2023-11-02
Payer: COMMERCIAL

## 2023-11-02 DIAGNOSIS — F41.9 ANXIETY: ICD-10-CM

## 2023-11-02 LAB — TSH SERPL DL<=0.005 MIU/L-ACNC: 1.63 UIU/ML (ref 0.38–5.33)

## 2023-11-02 PROCEDURE — 36415 COLL VENOUS BLD VENIPUNCTURE: CPT

## 2023-11-02 PROCEDURE — 84443 ASSAY THYROID STIM HORMONE: CPT

## 2023-11-06 ENCOUNTER — TELEMEDICINE (OUTPATIENT)
Dept: MEDICAL GROUP | Facility: CLINIC | Age: 43
End: 2023-11-06
Payer: COMMERCIAL

## 2023-11-06 DIAGNOSIS — F41.9 ANXIETY: ICD-10-CM

## 2023-11-06 PROCEDURE — 99213 OFFICE O/P EST LOW 20 MIN: CPT | Mod: GT,GE

## 2023-11-06 RX ORDER — PROPRANOLOL HYDROCHLORIDE 10 MG/1
10 TABLET ORAL 3 TIMES DAILY
Qty: 90 TABLET | Refills: 11 | Status: SHIPPED | OUTPATIENT
Start: 2023-11-06

## 2023-11-06 RX ORDER — CITALOPRAM 20 MG/1
10 TABLET ORAL DAILY
Qty: 30 TABLET | Refills: 2 | Status: SHIPPED | OUTPATIENT
Start: 2023-11-06 | End: 2023-11-21 | Stop reason: SDUPTHER
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
## 2023-11-21 ENCOUNTER — TELEMEDICINE (OUTPATIENT)
Dept: MEDICAL GROUP | Facility: CLINIC | Age: 43
End: 2023-11-21
Payer: COMMERCIAL

## 2023-11-21 DIAGNOSIS — F41.9 ANXIETY: ICD-10-CM

## 2023-11-21 PROCEDURE — 99213 OFFICE O/P EST LOW 20 MIN: CPT | Mod: GT,GE

## 2023-11-21 RX ORDER — CITALOPRAM 20 MG/1
20 TABLET ORAL DAILY
Qty: 90 TABLET | Refills: 2 | Status: SHIPPED | OUTPATIENT
Start: 2023-11-21 | End: 2023-12-19 | Stop reason: SDUPTHER

## 2023-11-21 NOTE — PROGRESS NOTES
[facility]     PATIENT ID:  NAME:  Johanne Raya  MRN:               [id number]  YOB: 1980    Date: 10:16 AM      Resident: Wade Del Real M.D.    Virtual Visit: New Patient   This visit was conducted via Zoom using secure and encrypted videoconferencing technology.   The patient was in their home in the [facility].    The patient's identity was confirmed and verbal consent was obtained for this virtual visit.       CC:  Situational anxiety and panic       HPI: Johanne Raya is a 43 y.o. female who presented for follow-up of situational anxiety and panic.  The patient states that she has been medicating with citalopram 20 mg daily over the past week as well as propanolol as needed prior to trying to leave the house when she knows she will develop anxiety as well as hydroxyzine as needed if she develops overwhelming anxiety.  She states she has been able to leave the house a few times on short walks and did seem to be improved there she also notes she was able to take 1 car ride approximately 4 streets from her house before having overwhelming anxiety.  She does see this as an improvement but is concerned and would like to see more improvement at a quicker pace.  She states that she does have a scheduled appointment with psychology in the near future to start therapy and is amenable to starting to see psychiatry as well.  Patient states overall she feels she is doing well and has no further concerns at this time.  She does note that she will need her [facility] paperwork filled out prior to tomorrow.    No problems updated.    REVIEW OF SYSTEMS:   Ten systems reviewed and were negative except as noted in the HPI.                PROBLEM LIST  Patient Active Problem List   Diagnosis    Anxiety    History of alcoholism (HCC)    Prediabetes        PAST SURGICAL HISTORY:  Past Surgical History:   Procedure Laterality Date    [place] LAP, OCTAVIA RESTRICT PROC, LONGITUDINAL GAS*  9/15/2021     Procedure: GASTRECTOMY, SLEEVE, LAPAROSCOPIC;  Surgeon: Regulo Hugo M.D.;  Location: SURGERY [facility];  Service: General    APPENDECTOMY  2014    TUBAL LIGATION  2010    OTHER ORTHOPEDIC SURGERY Left 2010    foot surgery to remove extra bone    PRIMARY C SECTION          TONSILLECTOMY      as a child       FAMILY HISTORY:  History reviewed. No pertinent family history.    SOCIAL HISTORY:   Social History     Tobacco Use    Smoking status: Every Day     Current packs/day: 0.00     Average packs/day: 1 pack/day for 16.0 years (16.0 ttl pk-yrs)     Types: Cigarettes     Start date: 2005     Last attempt to quit: 2021     Years since quittin.2    Smokeless tobacco: Never   Substance Use Topics    Alcohol use: Not Currently     Comment: when able to drink consumes 1/5       ALLERGIES:  Allergies   Allergen Reactions    Nsaids      Other reaction(s): GI bleeding    Penicillins Rash       OUTPATIENT MEDICATIONS:    Current Outpatient Medications:     citalopram (CELEXA) 20 MG Tab, Take 1 Tablet by mouth every day., Disp: 90 Tablet, Rfl: 2    propranolol (INDERAL) 10 MG Tab, Take 1 Tablet by mouth 3 times a day., Disp: 90 Tablet, Rfl: 11    traZODone (DESYREL) 50 MG Tab, Take 1 Tablet by mouth at bedtime., Disp: 90 Tablet, Rfl: 1    hydrOXYzine HCl (ATARAX) 25 MG Tab, Take 1 Tablet by mouth 3 times a day as needed for Itching. (Patient not taking: Reported on 2023), Disp: 30 Tablet, Rfl: 3    PHYSICAL EXAM:  There were no vitals filed for this visit.    General: Pt resting in NAD, Pleasant and cooperative   Skin:  Pink, warm and dry.  HEENT: NC/AT. EOMI.  Lungs:  Breathing comfortably, symmetric chest expansion  Abdomen:  Abdomen is soft, nontender  Extremities:  Full range of motion.  CNS:  Muscle tone is normal. No gross focal neurologic deficits      ASSESSMENT/PLAN:   43 y.o. female who presents to clinic for follow-up of situational anxiety and panic. The patient states that she  has been medicating with citalopram 20 mg daily over the past week as well as propanolol as needed prior to trying to leave the house when she knows she will develop anxiety as well as hydroxyzine as needed if she develops overwhelming anxiety.  She notes that overall she has been able to leave the house a few times on short walks and once was able to take a car ride with her  driving approximately 4 streets from their house before developing overwhelming anxiety.  She notes that she does feel an improvement with Celexa and would like to go up on the dose.  I counseled her that as she is only recently increased the dose to 20 mg we will wait approximately 1 more week before increasing dose from there.  Patient will start therapy on Monday the 27th but states she is not had any follow-up with psychiatry as to when she will start.  I provided her with the phone number to the referred clinic so that she can attempt to schedule an appointment sooner.  Patient's [place] paperwork has been filled out and is available for her  to  at his convenience.  Otherwise we will plan to follow-up in approximately 1 to 3 months depending on the patient's availability during the holiday season and how she feels things are going with psychiatry and psychology.      Problem List Items Addressed This Visit       Anxiety    Relevant Medications    citalopram (CELEXA) 20 MG Tab       Wade Del Real M.D.  PGY-3  [facility]

## 2023-12-19 ENCOUNTER — TELEMEDICINE (OUTPATIENT)
Dept: MEDICAL GROUP | Facility: CLINIC | Age: 43
End: 2023-12-19
Payer: COMMERCIAL

## 2023-12-19 DIAGNOSIS — F41.9 ANXIETY: ICD-10-CM

## 2023-12-19 PROCEDURE — 99213 OFFICE O/P EST LOW 20 MIN: CPT | Mod: GT,GE

## 2023-12-19 RX ORDER — CITALOPRAM 40 MG/1
40 TABLET ORAL DAILY
Qty: 90 TABLET | Refills: 0 | Status: SHIPPED | OUTPATIENT
Start: 2023-12-19 | End: 2024-03-26

## 2023-12-19 NOTE — PROGRESS NOTES
[facility]     PATIENT ID:  NAME:  Johanne Raya  MRN:               [id number]  YOB: 1980    Date: 10:14 AM    Virtual Visit: New Patient   This visit was conducted via Zoom using secure and encrypted videoconferencing technology.   The patient was in their home in the [facility].    The patient's identity was confirmed and verbal consent was obtained for this virtual visit.       Resident: Wade Del Real M.D.    CC:  Anxiety      HPI: Johanne Raya is a 43 y.o. female who presented for follow up of anxiety.  Patient states that overall she feels things are greatly improving.  She has been taking the 40 mg Celexa daily and notes that she has not needed to take propranolol or hydroxyzine for associated anxiety since increasing her dose.  She does note that this week she was able to drive her vehicle with her  as a passenger for approximately 15 minutes which has been the first time she has been able to do this in quite some time.  Patient does note she has not been able to follow-up with counseling but is reading the referred [facility] anxiety book at this time which has been very helpful.  Overall patient states that she has no concerns and feels much improved.    No problems updated.    REVIEW OF SYSTEMS:   Ten systems reviewed and were negative except as noted in the HPI.                PROBLEM LIST  Patient Active Problem List   Diagnosis    Anxiety    History of alcoholism (HCC)    Prediabetes        PAST SURGICAL HISTORY:  Past Surgical History:   Procedure Laterality Date    AR LAP, OCTAVIA RESTRICT PROC, LONGITUDINAL GAS*  9/15/2021    Procedure: GASTRECTOMY, SLEEVE, LAPAROSCOPIC;  Surgeon: Regulo Hugo M.D.;  Location: SURGERY [facility];  Service: General    APPENDECTOMY      TUBAL LIGATION  2010    OTHER ORTHOPEDIC SURGERY Left 2010    foot surgery to remove extra bone    PRIMARY C SECTION          TONSILLECTOMY      as a child       FAMILY  HISTORY:  History reviewed. No pertinent family history.    SOCIAL HISTORY:   Social History     Tobacco Use    Smoking status: Every Day     Current packs/day: 0.00     Average packs/day: 1 pack/day for 16.0 years (16.0 ttl pk-yrs)     Types: Cigarettes     Start date: 2005     Last attempt to quit: 2021     Years since quittin.3    Smokeless tobacco: Never   Substance Use Topics    Alcohol use: Not Currently     Comment: when able to drink consumes 1/5       ALLERGIES:  Allergies   Allergen Reactions    Nsaids      Other reaction(s): GI bleeding    Penicillins Rash       OUTPATIENT MEDICATIONS:    Current Outpatient Medications:     citalopram (CELEXA) 40 MG Tab, Take 1 Tablet by mouth every day for 90 days., Disp: 90 Tablet, Rfl: 0    propranolol (INDERAL) 10 MG Tab, Take 1 Tablet by mouth 3 times a day., Disp: 90 Tablet, Rfl: 11    traZODone (DESYREL) 50 MG Tab, Take 1 Tablet by mouth at bedtime., Disp: 90 Tablet, Rfl: 1    PHYSICAL EXAM:  There were no vitals filed for this visit.    General: Pt resting in NAD, pleasant and cooperative   Skin:  Pink, warm and dry.  HEENT: NC/AT. EOMI.  Lungs:  Symmetrical chest expansion.   Abdomen:  Abdomen is nondistended.   Extremities:  Full range of motion.  CNS:  Muscle tone is normal. No gross focal neurologic deficits      ASSESSMENT/PLAN:   43 y.o. female who presents for follow-up of anxiety.  Patient notes overall significant improvement since increasing her dose to citalopram 40 mg daily.  She was able to get in her car and drive for approximately 15 minutes with her  as a passenger.  I encouraged her to see this as great progress and encouraged her to continue to push herself to get out of the house and try this more often.  Patient overall seems very helpful and encouraged by her recent results.  She states that citalopram seems to be helping and she is very happy with the medication.  She has not had to use propranolol or hydroxyzine since  increasing her dose to citalopram 40 mg daily.  Will continue this current regimen and fill for 3 months with a plan to follow-up at that time.  Encourage patient if any other concerns or more difficulties with getting out of the house to return to clinic sooner.    Problem List Items Addressed This Visit       Anxiety    Relevant Medications    citalopram (CELEXA) 40 MG Tab       Wade Del Real M.D.  PGY-3  [facility]      Home

## 2024-03-21 DIAGNOSIS — F41.9 ANXIETY: ICD-10-CM

## 2024-03-22 NOTE — TELEPHONE ENCOUNTER
Received request via: Pharmacy    Was the patient seen in the last year in this department? Yes    Does the patient have an active prescription (recently filled or refills available) for medication(s) requested? No    Pharmacy Name: [facility]    Does the patient have [facility] Plus and need 100 day supply (blood pressure, diabetes and cholesterol meds only)? Patient does not have SCP

## 2024-03-26 RX ORDER — CITALOPRAM 40 MG/1
40 TABLET ORAL
Qty: 90 TABLET | Refills: 0 | Status: SHIPPED | OUTPATIENT
Start: 2024-03-26

## 2024-04-12 RX ORDER — TRAZODONE HYDROCHLORIDE 50 MG/1
50 TABLET ORAL
Qty: 90 TABLET | Refills: 1 | Status: SHIPPED | OUTPATIENT
Start: 2024-04-12

## 2024-04-12 NOTE — TELEPHONE ENCOUNTER
Received request via: Patient    Was the patient seen in the last year in this department? Yes    Does the patient have an active prescription (recently filled or refills available) for medication(s) requested? No    [facility]    Does the patient have group home Plus and need 100 day supply (blood pressure, diabetes and cholesterol meds only)? Patient does not have SCP

## 2024-05-15 ENCOUNTER — TELEMEDICINE (OUTPATIENT)
Dept: BEHAVIORAL HEALTH | Facility: PSYCHIATRIC FACILITY | Age: 44
End: 2024-05-15
Payer: COMMERCIAL

## 2024-05-15 DIAGNOSIS — F41.1 GAD (GENERALIZED ANXIETY DISORDER): ICD-10-CM

## 2024-05-15 DIAGNOSIS — F10.21 ALCOHOL USE DISORDER, SEVERE, IN EARLY REMISSION (HCC): ICD-10-CM

## 2024-05-15 DIAGNOSIS — F06.4 ANXIETY DISORDER DUE TO KNOWN PHYSIOLOGICAL CONDITION: ICD-10-CM

## 2024-05-15 PROCEDURE — 99214 OFFICE O/P EST MOD 30 MIN: CPT | Mod: GC

## 2024-05-15 RX ORDER — PREGABALIN 50 MG/1
50 CAPSULE ORAL NIGHTLY
Qty: 7 CAPSULE | Refills: 1 | Status: SHIPPED | OUTPATIENT
Start: 2024-05-15 | End: 2024-05-22 | Stop reason: SDUPTHER

## 2024-05-15 ASSESSMENT — ENCOUNTER SYMPTOMS
HEADACHES: 0
MYALGIAS: 0
TINGLING: 0
NAUSEA: 0
ABDOMINAL PAIN: 0
SHORTNESS OF BREATH: 0
DIARRHEA: 0
CHILLS: 0
CONSTIPATION: 0
PALPITATIONS: 0
DIZZINESS: 0
FEVER: 0
VOMITING: 0

## 2024-05-15 NOTE — ASSESSMENT & PLAN NOTE
Medications:   Continue citalopram 40 mg p.o. daily for depression and anxiety  START pregabalin 50 mg p.o. nightly for anxiety  Continue propranolol 10 mg p.o. twice daily to 3 times daily as needed for anxiety  Psychotherapy: Discussed starting psychotherapy in the future  Labs/studies: None  Other: N/A

## 2024-05-15 NOTE — ASSESSMENT & PLAN NOTE
Medications: None  Psychotherapy: Discussed starting psychotherapy in the future  Labs/studies: None  Other: N/A

## 2024-05-15 NOTE — PROGRESS NOTES
[facility] PSYCHIATRIC EVALUATION    Evaluation completed by: Walter Carter M.D.   Date of Service: 05/15/24   Appointment type: virtual/telepsychiatry appointment.  Attending:  Ninfa Stahl M.D.  Information below was collected from: patient    CHIEF COMPLIANT:  Initial  Evaluation      HPI:   Johanne Raya is a 44 y.o. old female who presents today for new psychiatric evaluation for the assessment of anxiety.    The patient reports that she has been doing okay today. She has been mostly staying in her house over the past 8 months due to high anxiety. She does not know what caused her to start having this anxiety.  However, she reports that she used to drink alcohol heavily prior to quitting in July 2023. She could drink a handle (1.75 L) of vodka per night, and she was drinking that way for 8 years. After she stopped drinking, she noticed that she could not leave the house due to elevated anxiety. When she is feeling anxious, her legs go numb and she feels like she is losing control of everything. She will also get shortness of breath and feel like she is going to pass out. She has a history of panic attacks, but she does not have them as often since she stays at home now. Her  is supportive, but he has had a hard time adjusting to this new situation. She currently takes Celexa 40 mg, propranolol 10 mg BID prn (mostly when she goes to the grocery store), and trazodone 50 mg nightly. She thinks that the Celexa is helping decrease her anxiety and improve her distress tolerance. She has been on Celexa for about 7 to 8 months. She currently describes feeling depressed. She gets about 7 to 8 hours of sleep per night, and she has been napping during the day due to fatigue. Her appetite has increased recently. She denies SI, HI, and AVH. Her goal for treatment is to be able to go outside of the house comfortably again.    PSYCHIATRIC REVIEW OF SYSTEMS: current symptoms as reported  "by pt.  Depression: Depressed mood, Low energy, and Higher than normal appetite  Jacey: Denies  Anxiety/Panic Attacks: She gets anxiety secondary to going outside in a car and finances. Due to her anxiety, she feels increased muscle tension in her jaw, increased irritability, increased restlessness, and decreased quality of life. See HPI about panic attacks.  Trauma: Reports history of emotional trauma from past deaths of people close to her and family dynamics.  Psychosis: Denies  ADHD: Denies  OCD: Reports history of intrusive thoughts    REVIEW OF SYSTEMS   Review of Systems   Constitutional:  Negative for chills, fever and malaise/fatigue.   Respiratory:  Negative for shortness of breath.    Cardiovascular:  Negative for chest pain and palpitations.   Gastrointestinal:  Negative for abdominal pain, constipation, diarrhea, nausea and vomiting.   Genitourinary:  Negative for dysuria and frequency.   Musculoskeletal:  Negative for myalgias.   Skin:  Negative for rash.   Neurological:  Negative for dizziness, tingling and headaches.     PAST PSYCHIATRIC HISTORY  Prior psychiatric hospitalization: She was hospitalized at [facility] 2 to 3 times for overdose with liquid opiates and for detox  Prior Self harm/suicide attempt: One overdose described above  Prior Diagnosis: Anxiety, panic attacks    PAST MEDICATION TRIALS:  Buspirone-   Hydroxyzine- not very effective  Paxil- made her feel \"crazy\"  Imipramine- worked well but stopped getting supplied after moving    PAST MEDICAL HISTORY  Past Medical History:   Diagnosis Date    Anxiety and depression     Snoring     no sleep study     Allergies   Allergen Reactions    Nsaids      Other reaction(s): GI bleeding    Penicillins Rash     Past Surgical History:   Procedure Laterality Date    [place] LAP, OCTAVIA RESTRICT PROC, LONGITUDINAL GAS*  9/15/2021    Procedure: GASTRECTOMY, SLEEVE, LAPAROSCOPIC;  Surgeon: Regulo Hugo M.D.;  Location: SURGERY [facility];  " Service: General    APPENDECTOMY  2014    TUBAL LIGATION  2010    OTHER ORTHOPEDIC SURGERY Left 2010    foot surgery to remove extra bone    PRIMARY C SECTION          TONSILLECTOMY      as a child      No family history on file.  -Whole family is alcoholics  -Half of her family has attempted suicide  -Schizophrenia, bipolar disorder, depression, anxiety, ADHD    Social History     Socioeconomic History    Marital status:     Highest education level: 12th grade   Tobacco Use    Smoking status: Every Day     Current packs/day: 0.00     Average packs/day: 1 pack/day for 16.0 years (16.0 ttl pk-yrs)     Types: Cigarettes     Start date: 2005     Last attempt to quit: 2021     Years since quittin.7    Smokeless tobacco: Never   Vaping Use    Vaping status: Never Used   Substance and Sexual Activity    Alcohol use: Not Currently     Comment: when able to drink consumes 1/5    Drug use: Not Currently     Types: Inhaled     Comment: marijuana     Social Determinants of Health     Financial Resource Strain: Patient Declined (2023)    Overall Financial Resource Strain (CARDIA)     Difficulty of Paying Living Expenses: Patient declined   Food Insecurity: Patient Declined (2023)    Hunger Vital Sign     Worried About Running Out of Food in the Last Year: Patient declined     Ran Out of Food in the Last Year: Patient declined   Transportation Needs: No Transportation Needs (2023)    PRAPARE - Transportation     Lack of Transportation (Medical): No     Lack of Transportation (Non-Medical): No   Physical Activity: Inactive (2023)    Exercise Vital Sign     Days of Exercise per Week: 0 days     Minutes of Exercise per Session: 0 min   Stress: Stress Concern Present (2023)    [employer] - Occupational Stress Questionnaire     Feeling of Stress : Very much   Social Connections: Unknown (2023)    Social Connection and Isolation Panel  "[NHANES]     Frequency of Communication with Friends and Family: More than three times a week     Frequency of Social Gatherings with Friends and Family: Patient declined     Attends Ep[employer] Services: Patient declined     Active Member of Clubs or Organizations: No     Attends Club or Organization Meetings: More than 4 times per year     Marital Status:    Housing Stability: Low Risk  (2023)    Housing Stability Vital Sign     Unable to Pay for Housing in the Last Year: No     Number of Places Lived in the Last Year: 1     Unstable Housing in the Last Year: No     Past Surgical History:   Procedure Laterality Date    [place] LAP, OCTAVIA RESTRICT PROC, LONGITUDINAL GAS*  9/15/2021    Procedure: GASTRECTOMY, SLEEVE, LAPAROSCOPIC;  Surgeon: Regulo Hugo M.D.;  Location: SURGERY [facility];  Service: General    APPENDECTOMY      TUBAL LIGATION      OTHER ORTHOPEDIC SURGERY Left 2010    foot surgery to remove extra bone    PRIMARY C SECTION          TONSILLECTOMY      as a child       PSYCHIATRIC EXAMINATION   There were no vitals taken for this visit.  Musculoskeletal: No abnormal movements noted  Appearance: well-developed, well-nourished, appears stated age, fair hygiene, and appropriately dressed, cooperative, engaged, friendly, pleasant, and good eye contact  Thought Process:  linear, coherent, goal-oriented, and organized  Abnormal or Psychotic Thoughts: Denies SI, denies HI, and no overt delusions noted  Speech: regular rate, rhythm, volume, tone, and syntax  Mood: \"excited\"  Affect: Full range, mostly euthymic/mildly anxious, congruent to stated mood, appropriate to context, nonlabile  SI/HI: Denies SI and HI  Orientation: alert and oriented  Recent and Remote Memory: no gross impairment in immediate, recent, or remote memory  Attention Span and Concentration:  Insight/Judgement into symptoms: good  Neurological Testing (MSSE Score and/or clock drawing): MMSE not performed during " this encounter      SCREENINGS:      9/15/2021     7:06 PM 9/21/2023     8:30 AM   Depression Screen (PHQ-2/PHQ-9)   PHQ-2 Total Score 0    PHQ-2 Total Score  0         9/21/2023     8:45 AM    EDEL-7 ANXIETY SCALE FLOWSHEET   Feeling nervous, anxious, or on edge 3   Not being able to stop or control worrying 3   Worrying too much about different things 3   Trouble relaxing 3   Being so restless that it is hard to sit still 3   Becoming easily annoyed or irritable 3   Feeling afraid as if something awful might happen 3   EDEL-7 Total Score 21       PREVENTATIVE CARE         ASSESSMENT  Johanne Raya is a 44 y.o. old female with past psychiatric history of anxiety and panic attacks who presents today for new psychiatric evaluation for the assessment of anxiety.  The patient reports that she has been having major increases in anxiety since about 8 months ago that have kept her from being able to leave her house.  Based on the history gathered today, I suspect that her alcohol use in the past is contributing to this current situation.  She spent years drinking very heavily so it is possible that stopping drinking in July 2023 triggered a compensatory increase in glutamate activity that is causing the extreme anxiety that she has been experiencing.  It is also possible that this increase in glutamate activity that is exacerbating an underlying generalized anxiety disorder, which the patient screens positive for today.  I am going to be providing her 2 diagnoses provisionally until I can spend more time getting to know the patient's history.  These 2 diagnoses are generalized anxiety disorder and anxiety disorder due to known physiological condition.  In addition I am diagnosing the patient with alcohol use disorder, severe, in early remission.  Because of my theorized mechanism for her current anxiety, I would like to start a medication that will increase GABAergic activity to counter her likely elevated  excitatory neurotransmitters.  I would like to start her on pregabalin 50 mg p.o. nightly, and I will attempt to titrate up on a weekly basis depending on the patient's response to the medication.  I will not make any changes to her citalopram dosage as she is already at the max dose.  I will also not make any changes to her trazodone dosage as it is proven effective for helping her sleep.  I would like to follow up with the patient in 1 week to reassess her symptoms.  In addition, I think that starting psychotherapy would be very beneficial for this patient in the future so that she could gain coping skills to help her navigate a life without alcohol.    [place]  records   reviewed.  No concerns about misuse of controlled substance.    CURRENT RISK ASSESSMENT       Suicide: Low       Homicide: Low       Self-Harm: Low       Relapse: Not applicable       Crisis Safety Plan Reviewed Not Indicated    DIAGNOSES/PLAN  Problem List Items Addressed This Visit          Psychiatry Problems    EDEL (generalized anxiety disorder)     Medications:   Continue citalopram 40 mg p.o. daily for depression and anxiety  START pregabalin 50 mg p.o. nightly for anxiety  Continue propranolol 10 mg p.o. twice daily to 3 times daily as needed for anxiety  Psychotherapy: Discussed starting psychotherapy in the future  Labs/studies: None  Other: N/A         Relevant Medications    pregabalin (LYRICA) 50 MG capsule    Anxiety disorder due to known physiological condition     Medications:   Continue citalopram 40 mg p.o. daily for depression and anxiety  START pregabalin 50 mg p.o. nightly for anxiety  Continue propranolol 10 mg p.o. twice daily to 3 times daily as needed for anxiety  Psychotherapy: Discussed starting psychotherapy in the future  Labs/studies: None  Other: N/A            Other    Alcohol use disorder, severe, in early remission (HCC)     Medications: None  Psychotherapy: Discussed starting psychotherapy in the  future  Labs/studies: None  Other: N/A               Medication options, alternatives (including no medications) and medication risks/benefits/side effects were discussed in detail.  The patient was advised to call, message clinician on [employer], or come in to the clinic if symptoms worsen or if questions/issues regarding their medications arise.  The patient verbalized understanding and agreement.    The patient was educated to call 911, call the suicide hotline, or go to the local ER if having thoughts of suicide or homicide.  The patient verbalized understanding and agreement.   The proposed treatment plan was discussed with the patient who was provided the opportunity to ask questions and make suggestions regarding alternative treatment. Patient verbalized understanding and expressed agreement with the plan.      Return to clinic in 1 week or sooner if symptoms worsen      This appointment was supervised by attending psychiatrist, Ninfa Stahl M.D., who agrees with assessment and treatment plan.  See attending attestation for more details.

## 2024-05-22 ENCOUNTER — TELEMEDICINE (OUTPATIENT)
Dept: BEHAVIORAL HEALTH | Facility: PSYCHIATRIC FACILITY | Age: 44
End: 2024-05-22
Payer: COMMERCIAL

## 2024-05-22 DIAGNOSIS — F10.21 ALCOHOL USE DISORDER, SEVERE, IN EARLY REMISSION (HCC): ICD-10-CM

## 2024-05-22 DIAGNOSIS — F41.9 ANXIETY: ICD-10-CM

## 2024-05-22 DIAGNOSIS — F06.4 ANXIETY DISORDER DUE TO KNOWN PHYSIOLOGICAL CONDITION: ICD-10-CM

## 2024-05-22 DIAGNOSIS — F41.1 GAD (GENERALIZED ANXIETY DISORDER): ICD-10-CM

## 2024-05-22 PROCEDURE — 99213 OFFICE O/P EST LOW 20 MIN: CPT | Mod: GT

## 2024-05-22 RX ORDER — PREGABALIN 50 MG/1
50 CAPSULE ORAL DAILY
Qty: 7 CAPSULE | Refills: 1 | Status: SHIPPED | OUTPATIENT
Start: 2024-05-22 | End: 2024-05-29 | Stop reason: SDUPTHER

## 2024-05-22 RX ORDER — PREGABALIN 50 MG/1
50 CAPSULE ORAL DAILY
Qty: 30 CAPSULE | Refills: 2 | Status: SHIPPED | OUTPATIENT
Start: 2024-05-22 | End: 2024-05-22

## 2024-05-22 ASSESSMENT — ENCOUNTER SYMPTOMS
DIARRHEA: 0
MYALGIAS: 0
NAUSEA: 0
CHILLS: 0
ABDOMINAL PAIN: 0
SHORTNESS OF BREATH: 0
BRUISES/BLEEDS EASILY: 0
PALPITATIONS: 0
DIZZINESS: 0
VOMITING: 0
FEVER: 0
CONSTIPATION: 0
TINGLING: 0
HEADACHES: 0

## 2024-05-22 NOTE — PROGRESS NOTES
[facility] Outpatient Psychiatric Follow Up Note  Evaluation completed by: Walter Carter M.D.   Date of Service: 05/22/24   Appointment type: virtual/telepsychiatry appointment: This evaluation was conducted via Zoom using secure and encrypted videoconferencing technology. The patient was in their home in the [facility].   The patient's identity was confirmed and verbal consent was obtained for this virtual visit.  Attending:  Ninfa Stahl M.D.  Information below was collected from: patient    CHIEF COMPLIANT:  Follow-Up        HPI:   Johanne Raya is a 44 y.o. old female with severe anxiety and severe alcohol use disorder in early remission who presents today for a follow-up visit. She was last seen on 5/16/2024, and at that time, the following recommendations were made: Start pregabalin 50 mg p.o. nightly for anxiety, continue Celexa 40 mg p.o. daily for depression and anxiety, continue trazodone 50 mg p.o. nightly for sleep    The patient reports that she felt very sedated for the first couple of days after starting pregabalin at night, but that effect seems to have worn off over time.  She has not had to use her trazodone while using pregabalin at night.  She has not experienced any other side effects. She is not sure if the medication is decreasing her anxiety because she has not tried to leave the house yet. She will try to go to the grocery store this weekend to see how she does. She has not experienced any changes in appetite or sleep. She denies depression and anxiety. She denies SI, HI, and AVH.      PSYCHIATRIC REVIEW OF SYSTEMS:current symptoms as reported by pt.  Depression: Denies depressed mood or anhedonia  Jacey: Patient denies any change in mood, increased energy, or marked irritability  Anxiety/Panic Attacks: See HPI  Trauma: Patient reports no signs or symptoms indicative of PTSD  Psychosis: Patient reports no signs or symptoms indicative of psychosis    REVIEW  OF SYSTEMS   Review of Systems   Constitutional:  Negative for chills, fever and malaise/fatigue.   Respiratory:  Negative for shortness of breath.    Cardiovascular:  Negative for chest pain and palpitations.   Gastrointestinal:  Negative for abdominal pain, constipation, diarrhea, nausea and vomiting.   Genitourinary:  Negative for dysuria and frequency.   Musculoskeletal:  Negative for myalgias.   Skin:  Negative for rash.   Neurological:  Negative for dizziness, tingling and headaches.   Endo/Heme/Allergies:  Does not bruise/bleed easily.         PAST MEDICAL HISTORY  Past Medical History:   Diagnosis Date    Anxiety and depression     Snoring     no sleep study     Allergies   Allergen Reactions    Nsaids      Other reaction(s): GI bleeding    Penicillins Rash     Past Surgical History:   Procedure Laterality Date    CT LAP, OCTAVIA RESTRICT PROC, LONGITUDINAL GAS*  9/15/2021    Procedure: GASTRECTOMY, SLEEVE, LAPAROSCOPIC;  Surgeon: Regulo Hugo M.D.;  Location: SURGERY [facility];  Service: General    APPENDECTOMY      TUBAL LIGATION      OTHER ORTHOPEDIC SURGERY Left 2010    foot surgery to remove extra bone    PRIMARY C SECTION          TONSILLECTOMY      as a child      No family history on file.  Social History     Socioeconomic History    Marital status:     Highest education level: 12th grade   Tobacco Use    Smoking status: Every Day     Current packs/day: 0.00     Average packs/day: 1 pack/day for 16.0 years (16.0 ttl pk-yrs)     Types: Cigarettes     Start date: 2005     Last attempt to quit: 2021     Years since quittin.7    Smokeless tobacco: Never   Vaping Use    Vaping status: Never Used   Substance and Sexual Activity    Alcohol use: Not Currently     Comment: when able to drink consumes 1/5    Drug use: Not Currently     Types: Inhaled     Comment: marijuana     Social Determinants of Health     Financial Resource Strain: Patient Declined (2023)     Overall Financial Resource Strain (CARDIA)     Difficulty of Paying Living Expenses: Patient declined   Food Insecurity: Patient Declined (2023)    Hunger Vital Sign     Worried About Running Out of Food in the Last Year: Patient declined     Ran Out of Food in the Last Year: Patient declined   Transportation Needs: No Transportation Needs (2023)    PRAPARE - Transportation     Lack of Transportation (Medical): No     Lack of Transportation (Non-Medical): No   Physical Activity: Inactive (2023)    Exercise Vital Sign     Days of Exercise per Week: 0 days     Minutes of Exercise per Session: 0 min   Stress: Stress Concern Present (2023)    [employer] [place] [employer] - Occupational Stress Questionnaire     Feeling of Stress : Very much   Social Connections: Unknown (2023)    Social Connection and Isolation Panel [NHANES]     Frequency of Communication with Friends and Family: More than three times a week     Frequency of Social Gatherings with Friends and Family: Patient declined     Attends Christian Services: Patient declined     Active Member of Clubs or Organizations: No     Attends Club or Organization Meetings: More than 4 times per year     Marital Status:    Housing Stability: Low Risk  (2023)    Housing Stability Vital Sign     Unable to Pay for Housing in the Last Year: No     Number of Places Lived in the Last Year: 1     Unstable Housing in the Last Year: No     Past Surgical History:   Procedure Laterality Date    [place] LAP, OCTAVIA RESTRICT PROC, LONGITUDINAL GAS*  9/15/2021    Procedure: GASTRECTOMY, SLEEVE, LAPAROSCOPIC;  Surgeon: Regulo Hugo M.D.;  Location: SURGERY [facility];  Service: General    APPENDECTOMY  2014    TUBAL LIGATION  2010    OTHER ORTHOPEDIC SURGERY Left 2010    foot surgery to remove extra bone    PRIMARY C SECTION  2005        TONSILLECTOMY      as a child       PSYCHIATRIC EXAMINATION   There were no vitals taken for this  "visit.  Musculoskeletal: No abnormal movements noted  Appearance: well-developed, well-nourished, appears stated age, fair hygiene, and appropriately dressed, cooperative, engaged, friendly, pleasant, and good eye contact  Thought Process:  linear, coherent, goal-oriented, and organized  Abnormal or Psychotic Thoughts: Denies SI, denies HI, and no overt delusions noted  Speech: regular rate, rhythm, volume, tone, and syntax  Mood: \"good\"  Affect: Full range, mostly euthymic/mildly anxious, congruent to stated mood, appropriate to context, nonlabile  SI/HI: Denies SI and HI  Orientation: alert and oriented  Recent and Remote Memory: no gross impairment in immediate, recent, or remote memory  Attention Span and Concentration: Within normal limits  Insight/Judgement into symptoms: good  Neurological Testing (MSSE Score and/or clock drawing): MMSE not performed during this encounter      SCREENINGS:      9/15/2021     7:06 PM 9/21/2023     8:30 AM   Depression Screen (PHQ-2/PHQ-9)   PHQ-2 Total Score 0    PHQ-2 Total Score  0         9/21/2023     8:45 AM    EDEL-7 ANXIETY SCALE FLOWSHEET   Feeling nervous, anxious, or on edge 3   Not being able to stop or control worrying 3   Worrying too much about different things 3   Trouble relaxing 3   Being so restless that it is hard to sit still 3   Becoming easily annoyed or irritable 3   Feeling afraid as if something awful might happen 3   EDEL-7 Total Score 21       PREVENTATIVE CARE       ASSESSMENT  Johanne Raya is a 44 y.o. old female with severe anxiety and severe alcohol use disorder in early remission who presents today for a follow-up visit.  The patient reports that she is not sure if pregabalin has helped to reduce her anxiety because she has not tried to go out into public spaces.  She is going to try to go to the grocery store this weekend to see how her anxiety reacts.  She was experiencing some sedation during the first couple of days initiating the " medication, but she has not experienced any adverse effects since then.  I am not going to make any dosage changes to the pregabalin at this time, so she will stay at 50 mg nightly.  No changes will be made to her Celexa, propranolol, or trazodone dosages at this time.  I will follow up with the patient in 1 week to reassess her symptoms.    [place]  records   reviewed.  No concerns about misuse of controlled substance.    CURRENT RISK ASSESSMENT       Suicide: Low       Homicide: Low       Self-Harm: Low       Relapse: Low       Crisis Safety Plan Reviewed Not Indicated    DIAGNOSES/PLAN  Problem List Items Addressed This Visit          Psychiatry Problems    EDEL (generalized anxiety disorder)     Medications:   Continue citalopram 40 mg p.o. daily for depression and anxiety  Continue pregabalin 50 mg p.o. nightly for anxiety  Continue propranolol 10 mg p.o. twice daily to 3 times daily as needed for anxiety  Psychotherapy: Discussed starting psychotherapy in the future  Labs/studies: None  Other: N/A         Relevant Medications    pregabalin (LYRICA) 50 MG capsule    Anxiety disorder due to known physiological condition     Medications:   Continue citalopram 40 mg p.o. daily for depression and anxiety  Continue pregabalin 50 mg p.o. nightly for anxiety  Continue propranolol 10 mg p.o. twice daily to 3 times daily as needed for anxiety  Psychotherapy: Discussed starting psychotherapy in the future  Labs/studies: None  Other: N/A            Other    Alcohol use disorder, severe, in early remission (HCC)     Medications: None  Psychotherapy: Discussed starting psychotherapy in the future  Labs/studies: None  Other: N/A               Medication options, alternatives (including no medications) and medication risks/benefits/side effects were discussed in detail.  The patient was advised to call[facility], or come in to the clinic if symptoms worsen or if questions/issues regarding their medications  arise.  The patient verbalized understanding and agreement.    The patient was educated to call 911, call the suicide hotline, or go to the local ER if having thoughts of suicide or homicide.  The patient verbalized understanding and agreement.   The proposed treatment plan was discussed with the patient who was provided the opportunity to ask questions and make suggestions regarding alternative treatment. Patient verbalized understanding and expressed agreement with the plan.      Return to clinic in 1 week or sooner if symptoms worsen      This appointment was supervised by attending psychiatrist, Ninfa Stahl M.D., who agrees with assessment and treatment plan.  See attending attestation for more details.

## 2024-05-22 NOTE — ASSESSMENT & PLAN NOTE
Medications:   Continue citalopram 40 mg p.o. daily for depression and anxiety  Continue pregabalin 50 mg p.o. nightly for anxiety  Continue propranolol 10 mg p.o. twice daily to 3 times daily as needed for anxiety  Psychotherapy: Discussed starting psychotherapy in the future  Labs/studies: None  Other: N/A

## 2024-05-23 NOTE — TELEPHONE ENCOUNTER
Received request via: Pharmacy    Was the patient seen in the last year in this department? Yes    Does the patient have an active prescription (recently filled or refills available) for medication(s) requested? No    Pharmacy Name: [facility]/pharmacy #[id number] - [place], [place] - [address]     Does the patient have nursing home Plus and need 100 day supply (blood pressure, diabetes and cholesterol meds only)? Patient does not have SCP

## 2024-05-24 RX ORDER — CITALOPRAM 40 MG/1
40 TABLET ORAL
Qty: 90 TABLET | Refills: 0 | Status: SHIPPED | OUTPATIENT
Start: 2024-05-24

## 2024-05-29 ENCOUNTER — APPOINTMENT (OUTPATIENT)
Dept: BEHAVIORAL HEALTH | Facility: PSYCHIATRIC FACILITY | Age: 44
End: 2024-05-29
Payer: COMMERCIAL

## 2024-05-29 DIAGNOSIS — F41.1 GAD (GENERALIZED ANXIETY DISORDER): ICD-10-CM

## 2024-05-30 RX ORDER — PREGABALIN 50 MG/1
50 CAPSULE ORAL DAILY
Qty: 7 CAPSULE | Refills: 1 | Status: SHIPPED | OUTPATIENT
Start: 2024-05-30 | End: 2024-06-06

## 2024-06-05 ENCOUNTER — APPOINTMENT (OUTPATIENT)
Dept: BEHAVIORAL HEALTH | Facility: PSYCHIATRIC FACILITY | Age: 44
End: 2024-06-05
Payer: COMMERCIAL

## 2024-06-05 DIAGNOSIS — F41.1 GAD (GENERALIZED ANXIETY DISORDER): ICD-10-CM

## 2024-06-05 DIAGNOSIS — F41.9 ANXIETY: ICD-10-CM

## 2024-06-05 RX ORDER — CITALOPRAM 20 MG/1
TABLET ORAL
Qty: 90 TABLET | Refills: 2 | Status: SHIPPED | OUTPATIENT
Start: 2024-06-05

## 2024-06-05 RX ORDER — PREGABALIN 50 MG/1
50 CAPSULE ORAL DAILY
Qty: 7 CAPSULE | Refills: 1 | Status: SHIPPED | OUTPATIENT
Start: 2024-06-05 | End: 2024-06-12

## 2024-06-05 NOTE — TELEPHONE ENCOUNTER
Received request via: Pharmacy    Was the patient seen in the last year in this department? Yes    Does the patient have an active prescription (recently filled or refills available) for medication(s) requested? No    Pharmacy Name: [facility]

## 2024-06-12 ENCOUNTER — TELEMEDICINE (OUTPATIENT)
Dept: BEHAVIORAL HEALTH | Facility: PSYCHIATRIC FACILITY | Age: 44
End: 2024-06-12
Payer: COMMERCIAL

## 2024-06-12 DIAGNOSIS — F10.21 ALCOHOL USE DISORDER, SEVERE, IN EARLY REMISSION (HCC): ICD-10-CM

## 2024-06-12 DIAGNOSIS — F41.1 GAD (GENERALIZED ANXIETY DISORDER): ICD-10-CM

## 2024-06-12 DIAGNOSIS — F06.4 ANXIETY DISORDER DUE TO KNOWN PHYSIOLOGICAL CONDITION: ICD-10-CM

## 2024-06-12 PROCEDURE — 99214 OFFICE O/P EST MOD 30 MIN: CPT | Mod: GT,GC

## 2024-06-12 RX ORDER — PREGABALIN 100 MG/1
100 CAPSULE ORAL NIGHTLY
Qty: 30 CAPSULE | Refills: 1 | Status: SHIPPED | OUTPATIENT
Start: 2024-06-12 | End: 2024-07-12

## 2024-06-12 ASSESSMENT — ENCOUNTER SYMPTOMS
VOMITING: 0
TINGLING: 0
ABDOMINAL PAIN: 0
DIARRHEA: 0
PALPITATIONS: 0
NAUSEA: 0
MYALGIAS: 0
CHILLS: 0
HEADACHES: 0
CONSTIPATION: 0
SHORTNESS OF BREATH: 0
BRUISES/BLEEDS EASILY: 0
FEVER: 0
DIZZINESS: 0

## 2024-06-12 NOTE — PROGRESS NOTES
[facility] Outpatient Psychiatric Follow Up Note  Evaluation completed by: Walter Carter M.D.   Date of Service: 6/12/2024  Appointment type: virtual/telepsychiatry appointment: This evaluation was conducted via Zoom using secure and encrypted videoconferencing technology. The patient was in their home in the [facility].   The patient's identity was confirmed and verbal consent was obtained for this virtual visit.  Attending:  Ninfa Stahl M.D.  Information below was collected from: patient    CHIEF COMPLIANT:  Follow-Up      HPI:   Johanne Raya is a 44 y.o. old female with severe anxiety and severe alcohol use disorder in early remission who presents today for a follow-up visit. She was last seen on 5/22/2024, and at that time, the following recommendations were made: Continue pregabalin 50 mg p.o. nightly for anxiety, continue Celexa 40 mg p.o. daily for depression and anxiety, continue propranolol 10 mg p.o. twice daily to 3 times daily as needed for anxiety, continue trazodone 50 mg p.o. nightly as needed for sleep    The patient reports that she had to put her dog down last week, which was very difficult for her at the time. She is still feeling depressed because of this situation, but she is managing ok and doing her daily activities. She was able to go outside to the grocery store a few times with her  while on pregabalin 50 mg. She still feels nervous when going out, but it is better than before. She is eating more over the past week, which she thinks is because of her depressive feelings. She denies issues with sleep. She denies SI, HI, and AVH.  She feels like increasing her dosage of pregabalin may be beneficial for providing her with better control of her anxiety.      PSYCHIATRIC REVIEW OF SYSTEMS:current symptoms as reported by pt.  Depression: Denies depressed mood or anhedonia  Jacey: Patient denies any change in mood, increased energy, or marked  irritability  Anxiety/Panic Attacks: See HPI  Trauma: Patient reports no signs or symptoms indicative of PTSD  Psychosis: Patient reports no signs or symptoms indicative of psychosis    REVIEW OF SYSTEMS   Review of Systems   Constitutional:  Negative for chills, fever and malaise/fatigue.   Respiratory:  Negative for shortness of breath.    Cardiovascular:  Negative for chest pain and palpitations.   Gastrointestinal:  Negative for abdominal pain, constipation, diarrhea, nausea and vomiting.   Genitourinary:  Negative for dysuria and frequency.   Musculoskeletal:  Negative for myalgias.   Skin:  Negative for rash.   Neurological:  Negative for dizziness, tingling and headaches.   Endo/Heme/Allergies:  Does not bruise/bleed easily.         PAST MEDICAL HISTORY  Past Medical History:   Diagnosis Date    Anxiety and depression     Snoring     no sleep study     Allergies   Allergen Reactions    Nsaids      Other reaction(s): GI bleeding    Penicillins Rash     Past Surgical History:   Procedure Laterality Date    [place] LAP, OCTAVIA RESTRICT PROC, LONGITUDINAL GAS*  9/15/2021    Procedure: GASTRECTOMY, SLEEVE, LAPAROSCOPIC;  Surgeon: Regulo Hugo M.D.;  Location: SURGERY [facility];  Service: General    APPENDECTOMY      TUBAL LIGATION      OTHER ORTHOPEDIC SURGERY Left 2010    foot surgery to remove extra bone    PRIMARY C SECTION          TONSILLECTOMY      as a child      No family history on file.  Social History     Socioeconomic History    Marital status:     Highest education level: 12th grade   Tobacco Use    Smoking status: Every Day     Current packs/day: 0.00     Average packs/day: 1 pack/day for 16.0 years (16.0 ttl pk-yrs)     Types: Cigarettes     Start date: 2005     Last attempt to quit: 2021     Years since quittin.8    Smokeless tobacco: Never   Vaping Use    Vaping status: Never Used   Substance and Sexual Activity    Alcohol use: Not Currently     Comment: when  able to drink consumes 1/5    Drug use: Not Currently     Types: Inhaled     Comment: marijuana     Social Determinants of Health     Financial Resource Strain: Patient Declined (9/20/2023)    Overall Financial Resource Strain (CARDIA)     Difficulty of Paying Living Expenses: Patient declined   Food Insecurity: Patient Declined (9/20/2023)    Hunger Vital Sign     Worried About Running Out of Food in the Last Year: Patient declined     Ran Out of Food in the Last Year: Patient declined   Transportation Needs: No Transportation Needs (9/20/2023)    PRAPARE - Transportation     Lack of Transportation (Medical): No     Lack of Transportation (Non-Medical): No   Physical Activity: Inactive (9/20/2023)    Exercise Vital Sign     Days of Exercise per Week: 0 days     Minutes of Exercise per Session: 0 min   Stress: Stress Concern Present (9/20/2023)    [employer] [place] [employer] - Occupational Stress Questionnaire     Feeling of Stress : Very much   Social Connections: Unknown (9/20/2023)    Social Connection and Isolation Panel [NHANES]     Frequency of Communication with Friends and Family: More than three times a week     Frequency of Social Gatherings with Friends and Family: Patient declined     Attends Sabianist Services: Patient declined     Active Member of Clubs or Organizations: No     Attends Club or Organization Meetings: More than 4 times per year     Marital Status:    Housing Stability: Low Risk  (9/20/2023)    Housing Stability Vital Sign     Unable to Pay for Housing in the Last Year: No     Number of Places Lived in the Last Year: 1     Unstable Housing in the Last Year: No     Past Surgical History:   Procedure Laterality Date    [place] LAP, OCTAVIA RESTRICT PROC, LONGITUDINAL GAS*  9/15/2021    Procedure: GASTRECTOMY, SLEEVE, LAPAROSCOPIC;  Surgeon: Regulo Hugo M.D.;  Location: SURGERY [facility];  Service: General    APPENDECTOMY  2014    TUBAL LIGATION  2010    OTHER ORTHOPEDIC  "SURGERY Left     foot surgery to remove extra bone    PRIMARY C SECTION  2005        TONSILLECTOMY      as a child       PSYCHIATRIC EXAMINATION   There were no vitals taken for this visit.  Musculoskeletal: No abnormal movements noted  Appearance: well-developed, well-nourished, appears stated age, fair hygiene, and appropriately dressed, cooperative, engaged, friendly, pleasant, and good eye contact  Thought Process:  linear, coherent, goal-oriented, and organized  Abnormal or Psychotic Thoughts: Denies SI, denies HI, and no overt delusions noted  Speech: regular rate, rhythm, volume, tone, and syntax  Mood: \"Okay\"  Affect: Full range, mostly euthymic/mildly anxious, congruent to stated mood, appropriate to context, nonlabile  SI/HI: Denies SI and HI  Orientation: alert and oriented  Recent and Remote Memory: no gross impairment in immediate, recent, or remote memory  Attention Span and Concentration: Within normal limits  Insight/Judgement into symptoms: good  Neurological Testing (MSSE Score and/or clock drawing): MMSE not performed during this encounter      SCREENINGS:      9/15/2021     7:06 PM 2023     8:30 AM   Depression Screen (PHQ-2/PHQ-9)   PHQ-2 Total Score 0    PHQ-2 Total Score  0         2023     8:45 AM    EDEL-7 ANXIETY SCALE FLOWSHEET   Feeling nervous, anxious, or on edge 3   Not being able to stop or control worrying 3   Worrying too much about different things 3   Trouble relaxing 3   Being so restless that it is hard to sit still 3   Becoming easily annoyed or irritable 3   Feeling afraid as if something awful might happen 3   EDEL-7 Total Score 21       PREVENTATIVE CARE       ASSESSMENT  Johanne Raya is a 44 y.o. old female with severe anxiety and severe alcohol use disorder in early remission who presents today for a follow-up visit.  The patient reports that pregabalin has been helpful for allowing her to leave the house and enter public settings on " several occasions.  However, she still struggles with feeling anxious when she leaves her house so she is wondering if increasing her dosage of pregabalin would be helpful.  I think that increasing her dosage to 100 mg p.o. nightly would be beneficial for her at this time.  I also talked with her about the possibility of utilizing split dosing in the future if she finds that her anxiety is higher at different points throughout the day.  I am not going to make any changes to her citalopram, trazodone, or propranolol dosages today.  Patient has maintained sobriety from alcohol.  Today is going to be our last visit since I am transitioning out of clinic, and her next visit in 1 month will be with a new resident.    [place]  records   reviewed.  No concerns about misuse of controlled substance.    CURRENT RISK ASSESSMENT       Suicide: Low       Homicide: Low       Self-Harm: Low       Relapse: Low       Crisis Safety Plan Reviewed Not Indicated    DIAGNOSES/PLAN  Problem List Items Addressed This Visit          Psychiatry Problems    EDEL (generalized anxiety disorder)     Medications:   Continue citalopram 40 mg p.o. daily for depression and anxiety  Increase pregabalin to 100 mg p.o. nightly for anxiety  Continue propranolol 10 mg p.o. twice daily to 3 times daily as needed for anxiety  Continue trazodone 50 mg p.o. nightly as needed for sleep  Psychotherapy: Discussed starting psychotherapy in the future  Labs/studies: None  Other: N/A         Relevant Medications    pregabalin (LYRICA) 100 MG Cap    Anxiety disorder due to known physiological condition     Medications:   Continue citalopram 40 mg p.o. daily for depression and anxiety  Increase pregabalin to 100 mg p.o. nightly for anxiety  Continue propranolol 10 mg p.o. twice daily to 3 times daily as needed for anxiety  Continue trazodone 50 mg p.o. nightly as needed for sleep  Psychotherapy: Discussed starting psychotherapy in the future  Labs/studies:  None  Other: N/A         Relevant Medications    pregabalin (LYRICA) 100 MG Cap       Other    Alcohol use disorder, severe, in early remission (HCC)     Medications: None  Psychotherapy: Discussed starting psychotherapy in the future  Labs/studies: None  Other: N/A             Medication options, alternatives (including no medications) and medication risks/benefits/side effects were discussed in detail.  The patient was advised to call, message clinician on [facility], or come in to the clinic if symptoms worsen or if questions/issues regarding their medications arise.  The patient verbalized understanding and agreement.    The patient was educated to call 911, call the suicide hotline, or go to the local ER if having thoughts of suicide or homicide.  The patient verbalized understanding and agreement.   The proposed treatment plan was discussed with the patient who was provided the opportunity to ask questions and make suggestions regarding alternative treatment. Patient verbalized understanding and expressed agreement with the plan.      Return to clinic in 1 month or sooner if symptoms worsen      This appointment was supervised by attending psychiatrist, Ninfa Stahl M.D., who agrees with assessment and treatment plan.  See attending attestation for more details.

## 2024-06-12 NOTE — ASSESSMENT & PLAN NOTE
Medications:   Continue citalopram 40 mg p.o. daily for depression and anxiety  Increase pregabalin to 100 mg p.o. nightly for anxiety  Continue propranolol 10 mg p.o. twice daily to 3 times daily as needed for anxiety  Continue trazodone 50 mg p.o. nightly as needed for sleep  Psychotherapy: Discussed starting psychotherapy in the future  Labs/studies: None  Other: N/A

## 2024-07-24 ENCOUNTER — TELEMEDICINE (OUTPATIENT)
Dept: BEHAVIORAL HEALTH | Facility: PSYCHIATRIC FACILITY | Age: 44
End: 2024-07-24
Payer: COMMERCIAL

## 2024-07-24 DIAGNOSIS — F10.21 ALCOHOL USE DISORDER, SEVERE, IN SUSTAINED REMISSION (HCC): ICD-10-CM

## 2024-07-24 DIAGNOSIS — F17.200 TOBACCO USE DISORDER: ICD-10-CM

## 2024-07-24 DIAGNOSIS — F32.9 CURRENT EPISODE OF MAJOR DEPRESSIVE DISORDER WITHOUT PRIOR EPISODE, UNSPECIFIED DEPRESSION EPISODE SEVERITY: ICD-10-CM

## 2024-07-24 DIAGNOSIS — F41.1 GAD (GENERALIZED ANXIETY DISORDER): ICD-10-CM

## 2024-07-24 PROCEDURE — 99214 OFFICE O/P EST MOD 30 MIN: CPT

## 2024-07-24 RX ORDER — PREGABALIN 100 MG/1
100 CAPSULE ORAL DAILY
COMMUNITY
End: 2024-07-24

## 2024-07-24 RX ORDER — PREGABALIN 50 MG/1
50 CAPSULE ORAL 3 TIMES DAILY
Qty: 90 CAPSULE | Refills: 0 | Status: SHIPPED | OUTPATIENT
Start: 2024-08-23 | End: 2024-09-22

## 2024-07-24 RX ORDER — PREGABALIN 50 MG/1
50 CAPSULE ORAL 3 TIMES DAILY
Qty: 90 CAPSULE | Refills: 0 | Status: SHIPPED | OUTPATIENT
Start: 2024-07-24 | End: 2024-08-23

## 2024-07-24 ASSESSMENT — ANXIETY QUESTIONNAIRES
5. BEING SO RESTLESS THAT IT IS HARD TO SIT STILL: NEARLY EVERY DAY
2. NOT BEING ABLE TO STOP OR CONTROL WORRYING: NEARLY EVERY DAY
6. BECOMING EASILY ANNOYED OR IRRITABLE: SEVERAL DAYS
3. WORRYING TOO MUCH ABOUT DIFFERENT THINGS: NEARLY EVERY DAY
4. TROUBLE RELAXING: NEARLY EVERY DAY
GAD7 TOTAL SCORE: 19
1. FEELING NERVOUS, ANXIOUS, OR ON EDGE: NEARLY EVERY DAY
7. FEELING AFRAID AS IF SOMETHING AWFUL MIGHT HAPPEN: NEARLY EVERY DAY

## 2024-07-24 ASSESSMENT — ENCOUNTER SYMPTOMS
HALLUCINATIONS: 0
BACK PAIN: 0
DIZZINESS: 0
NECK PAIN: 0
BLURRED VISION: 0
COUGH: 0
FEVER: 0
ABDOMINAL PAIN: 0
CHILLS: 0

## 2024-07-24 ASSESSMENT — PATIENT HEALTH QUESTIONNAIRE - PHQ9
5. POOR APPETITE OR OVEREATING: 3 - NEARLY EVERY DAY
SUM OF ALL RESPONSES TO PHQ QUESTIONS 1-9: 17
CLINICAL INTERPRETATION OF PHQ2 SCORE: 5

## 2024-07-24 ASSESSMENT — LIFESTYLE VARIABLES: SUBSTANCE_ABUSE: 0

## 2024-08-21 ENCOUNTER — TELEMEDICINE (OUTPATIENT)
Dept: BEHAVIORAL HEALTH | Facility: PSYCHIATRIC FACILITY | Age: 44
End: 2024-08-21
Payer: COMMERCIAL

## 2024-08-21 DIAGNOSIS — F10.21 ALCOHOL USE DISORDER, SEVERE, IN SUSTAINED REMISSION (HCC): ICD-10-CM

## 2024-08-21 DIAGNOSIS — F32.0 CURRENT MILD EPISODE OF MAJOR DEPRESSIVE DISORDER WITHOUT PRIOR EPISODE (HCC): ICD-10-CM

## 2024-08-21 DIAGNOSIS — F41.1 GAD (GENERALIZED ANXIETY DISORDER): ICD-10-CM

## 2024-08-21 PROCEDURE — 99214 OFFICE O/P EST MOD 30 MIN: CPT

## 2024-08-21 RX ORDER — PREGABALIN 75 MG/1
75 CAPSULE ORAL 3 TIMES DAILY
Qty: 90 CAPSULE | Refills: 0 | Status: SHIPPED | OUTPATIENT
Start: 2024-09-04 | End: 2024-08-23 | Stop reason: SDUPTHER

## 2024-08-21 RX ORDER — PREGABALIN 75 MG/1
75 CAPSULE ORAL 3 TIMES DAILY
Qty: 90 CAPSULE | Refills: 0 | Status: SHIPPED | OUTPATIENT
Start: 2024-08-21 | End: 2024-08-23 | Stop reason: SDUPTHER

## 2024-08-21 ASSESSMENT — ANXIETY QUESTIONNAIRES
2. NOT BEING ABLE TO STOP OR CONTROL WORRYING: MORE THAN HALF THE DAYS
7. FEELING AFRAID AS IF SOMETHING AWFUL MIGHT HAPPEN: MORE THAN HALF THE DAYS
1. FEELING NERVOUS, ANXIOUS, OR ON EDGE: MORE THAN HALF THE DAYS
3. WORRYING TOO MUCH ABOUT DIFFERENT THINGS: MORE THAN HALF THE DAYS
IF YOU CHECKED OFF ANY PROBLEMS ON THIS QUESTIONNAIRE, HOW DIFFICULT HAVE THESE PROBLEMS MADE IT FOR YOU TO DO YOUR WORK, TAKE CARE OF THINGS AT HOME, OR GET ALONG WITH OTHER PEOPLE: VERY DIFFICULT
6. BECOMING EASILY ANNOYED OR IRRITABLE: NEARLY EVERY DAY
5. BEING SO RESTLESS THAT IT IS HARD TO SIT STILL: SEVERAL DAYS
4. TROUBLE RELAXING: NEARLY EVERY DAY
GAD7 TOTAL SCORE: 15

## 2024-08-21 ASSESSMENT — PATIENT HEALTH QUESTIONNAIRE - PHQ9
SUM OF ALL RESPONSES TO PHQ QUESTIONS 1-9: 19
CLINICAL INTERPRETATION OF PHQ2 SCORE: 5
5. POOR APPETITE OR OVEREATING: 3 - NEARLY EVERY DAY

## 2024-08-21 ASSESSMENT — ENCOUNTER SYMPTOMS
WEIGHT LOSS: 0
NAUSEA: 0
BLURRED VISION: 0
DIZZINESS: 0
CHILLS: 0
HEADACHES: 0
FEVER: 0
DEPRESSION: 1

## 2024-08-21 NOTE — ASSESSMENT & PLAN NOTE
Problem type: Chronic Illness, Stable    Assessment: History of alcoholism: 750 milliliters of vodka a day.  Past symptoms of cravings, compulsion, poor consequences, deteriorating social relationships, dependence, and tolerance.  History of emergency department visits for detox.  Sustain sobriety since July 2023 after [facility] visit.      Plan  Medication: n/a    Therapy: n/a    Other Orders: n/a

## 2024-08-21 NOTE — ASSESSMENT & PLAN NOTE
Problem type: Chronic Illness with exacerbation, progression, or side effects of treatment    Assessment: History of EDEL with exacerbation in February/March 2024 after needing to put down emotional support animal. Unable to leave house without , has worsened depression and mood due to inability to do things. Improvement of symptoms, decrease derailment, better concentration and ability to control catastrophizing with TID dosing of pregabalin vs. QHS. Requests to increase.     Plan  Medication:   -INCREASE pregabalin 50mg TID p.o. to 75mg TID p.o. for anxiety.  -Continue citalopram 40 mg p.o. daily for depression and anxiety   -Continue propranolol 10 mg p.o. twice daily to 3 times daily as needed for anxiety; consider cessation in future if anxiety better controlled with pregabalin, or titrating up on propranolol as needed dose for situational help for anxiety symptoms.  -Continue trazodone 50 mg p.o. nightly as needed for sleep     Therapy: Continue trazodone 50 mg p.o. nightly as needed for sleep     Other Orders: None    There is 1 Wet Read(s) to document. There are no Wet Read(s) to document.

## 2024-08-21 NOTE — PROGRESS NOTES
"[facility] Outpatient Psychiatric Follow Up Note  Evaluation completed by: Evelia Carson D.O.   Date of Service: 08/21/2024  Appointment type: virtual/telepsychiatry appointment: This evaluation was conducted via Zoom using secure and encrypted videoconferencing technology. The patient was in their home in the [facility].   The patient's identity was confirmed and verbal consent was obtained for this virtual visit.  Attending:  Ninfa Stahl M.D.  Information below was collected from: patient    CHIEF COMPLIANT:  Medication Management (\"I think that new medication is working\")        HPI:   Johanne Raya is a 44 y.o. old female who presents today for regularly scheduled follow up for assessment of Medication Management (\"I think that new medication is working\")  Patient states pregabalin has been helping with Decrease flight of ideas, fixation of anxiety, rapid thinking, able to distract, able to redirect, better concentration. She takes it at 8am, 1-2pm, 6-7pm. Starts working ~1 hour after taking at 8am, wears off around 1pm.  Last week was able to go into car, states \"the anxiety in the car wasn't as bad. We went for around the little streets. I couldn't do the major crossroads though.\" Denies adverse effects seen, would like increase. Continues to intermittently use propranolol and trazodone as needed, states depression still present from emotional support animal passing away however has increased in mood due to being able to leave house more often, with majority of depressive symptoms due to being unable to leave house.      PSYCHIATRIC REVIEW OF SYSTEMS:current symptoms as reported by pt.  Depression: Depressed mood, Difficulty sleeping, Sense of hopelessness, Low energy, Higher than normal appetite  Jacey: Patient denies any change in mood, increased energy, or marked irritability  Anxiety/Panic Attacks: sweating, shortness of breath, dizziness, racing thoughts, feelings of " losing control, difficulty concentrating  Psychosis: Patient reports no signs or symptoms indicative of psychosis    CURRENT MEDICATIONS    Current Outpatient Medications:     pregabalin (LYRICA) 75 MG Cap, Take 1 Capsule by mouth 3 times a day for 30 days., Disp: 90 Capsule, Rfl: 0    [START ON 2024] pregabalin (LYRICA) 75 MG Cap, Take 1 Capsule by mouth 3 times a day for 30 days., Disp: 90 Capsule, Rfl: 0    citalopram (CELEXA) 40 MG Tab, TAKE 1 TABLET BY MOUTH EVERY DAY, Disp: 90 Tablet, Rfl: 0    traZODone (DESYREL) 50 MG Tab, TAKE 1 TABLET BY MOUTH EVERYDAY AT BEDTIME, Disp: 90 Tablet, Rfl: 1    propranolol (INDERAL) 10 MG Tab, Take 1 Tablet by mouth 3 times a day., Disp: 90 Tablet, Rfl: 11     REVIEW OF SYSTEMS   Review of Systems   Constitutional:  Negative for chills, fever and weight loss.   Eyes:  Negative for blurred vision.   Cardiovascular:  Negative for chest pain.   Gastrointestinal:  Negative for nausea.   Neurological:  Negative for dizziness and headaches.   Psychiatric/Behavioral:  Positive for depression.      Neurologic: no tics, tremors, dyskinesias. The patient denies dizzniess, syncope, falls. Ambulates independently    PAST MEDICAL HISTORY  Past Medical History:   Diagnosis Date    Alcohol abuse     Anxiety     Anxiety and depression     Depression     Snoring     no sleep study    Suicide attempt (HCC)      Allergies   Allergen Reactions    Nsaids      Other reaction(s): GI bleeding    Penicillins Rash     Past Surgical History:   Procedure Laterality Date    NH LAP, OCTAVIA RESTRICT PROC, LONGITUDINAL GAS*  09/15/2021    Procedure: GASTRECTOMY, SLEEVE, LAPAROSCOPIC;  Surgeon: Regulo Hugo M.D.;  Location: SURGERY [facility];  Service: General    APPENDECTOMY  2014    TUBAL LIGATION  2010    OTHER ORTHOPEDIC SURGERY Left 2010    foot surgery to remove extra bone    TUBAL COAGULATION LAPAROSCOPIC BILATERAL Bilateral 2009    PRIMARY C SECTION  2005        TONSILLECTOMY       as a child      Family History   Problem Relation Age of Onset    Pancreatic Cancer Father     Drug abuse Sister     Depression Sister     Schizophrenia Sister     Hodgkin's Lymphoma Maternal Grandfather      Social History     Socioeconomic History    Marital status:     Highest education level: 12th grade   Tobacco Use    Smoking status: Every Day     Current packs/day: 1.00     Average packs/day: 1 pack/day for 29.1 years (29.1 ttl pk-yrs)     Types: Cigarettes     Start date: 7/1/1995     Passive exposure: Current    Smokeless tobacco: Never   Vaping Use    Vaping status: Never Used   Substance and Sexual Activity    Alcohol use: Not Currently     Comment: when able to drink consumes 1/5    Drug use: Not Currently     Types: Inhaled     Comment: marijuana    Sexual activity: Yes     Partners: Male     Comment: Tubes tied in early 30s.   Social History Narrative    Social Determinants of Health    Alcohol Use: Alcohol Misuse (9/20/2023)        AUDIT-C            Frequency of Alcohol Consumption: Patient declined            Average Number of Drinks: 10 or more            Frequency of Binge Drinking: Patient declined    Depression: At risk (7/24/2024)        PHQ-2            PHQ-2 Score: 5    Financial Resource Strain: Patient Declined (9/20/2023)        Overall Financial Resource Strain (CARDIA)            Difficulty of Paying Living Expenses: Patient declined    Food Insecurity: Patient Declined (9/20/2023)        Hunger Vital Sign            Worried About Running Out of Food in the Last Year: Patient declined            Ran Out of Food in the Last Year: Patient declined    Housing Stability: Low Risk  (9/20/2023)        Housing Stability Vital Sign            Unable to Pay for Housing in the Last Year: No            Number of Places Lived in the Last Year: 1            Unstable Housing in the Last Year: No    Intimate Partner Violence: Not on file    Physical Activity: Inactive (9/20/2023)        Exercise  Vital Sign            Days of Exercise per Week: 0 days            Minutes of Exercise per Session: 0 min    Social Connections: Unknown (9/20/2023)        Social Connection and Isolation Panel [NHANES]            Frequency of Communication with Friends and Family: More than three times a week            Frequency of Social Gatherings with Friends and Family: Patient declined            Attends Tenriism Services: Patient declined            Active Member of Clubs or Organizations: No            Attends Club or Organization Meetings: More than 4 times per year            Marital Status:     Stress: Stress Concern Present (9/20/2023)        [place] [employer] - Occupational Stress Questionnaire            Feeling of Stress : Very much    Tobacco Use: Medium Risk (7/24/2024)        Patient History            Smoking Tobacco Use: Former            Smokeless Tobacco Use: Never            Passive Exposure: Not on file    Transportation Needs: No Transportation Needs (9/20/2023)        PRAPARE - Transportation            Lack of Transportation (Medical): No            Lack of Transportation (Non-Medical): No    Utilities: Not on file      Social Determinants of Health     Financial Resource Strain: Patient Declined (9/20/2023)    Overall Financial Resource Strain (CARDIA)     Difficulty of Paying Living Expenses: Patient declined   Food Insecurity: Patient Declined (9/20/2023)    Hunger Vital Sign     Worried About Running Out of Food in the Last Year: Patient declined     Ran Out of Food in the Last Year: Patient declined   Transportation Needs: No Transportation Needs (9/20/2023)    PRAPARE - Transportation     Lack of Transportation (Medical): No     Lack of Transportation (Non-Medical): No   Physical Activity: Inactive (9/20/2023)    Exercise Vital Sign     Days of Exercise per Week: 0 days     Minutes of Exercise per Session: 0 min   Stress: Stress Concern Present (9/20/2023)    [place]  "[place] [employer] Occupational [employer] - Occupational Stress Questionnaire     Feeling of Stress : Very much   Social Connections: Unknown (2023)    Social Connection and Isolation Panel [NHANES]     Frequency of Communication with Friends and Family: More than three times a week     Frequency of Social Gatherings with Friends and Family: Patient declined     Attends Yarsani Services: Patient declined     Active Member of Clubs or Organizations: No     Attends Club or Organization Meetings: More than 4 times per year     Marital Status:    Housing Stability: Low Risk  (2023)    Housing Stability Vital Sign     Unable to Pay for Housing in the Last Year: No     Number of Places Lived in the Last Year: 1     Unstable Housing in the Last Year: No     Past Surgical History:   Procedure Laterality Date    MS LAP, OCTAVIA RESTRICT PROC, LONGITUDINAL GAS*  09/15/2021    Procedure: GASTRECTOMY, SLEEVE, LAPAROSCOPIC;  Surgeon: Regulo Hugo M.D.;  Location: SURGERY [facility];  Service: General    APPENDECTOMY      TUBAL LIGATION      OTHER ORTHOPEDIC SURGERY Left 2010    foot surgery to remove extra bone    TUBAL COAGULATION LAPAROSCOPIC BILATERAL Bilateral 2009    PRIMARY C SECTION  2005        TONSILLECTOMY      as a child       PSYCHIATRIC EXAMINATION   There were no vitals taken for this visit.  Musculoskeletal: No abnormal movements noted  Appearance:  telemedicine visit , cooperative, engaged, friendly, and pleasant  Thought Process:  linear, coherent, and goal-oriented  Abnormal or Psychotic Thoughts: No evidence of auditory or visual hallucinations, and no overt delusions noted  Speech: regular rate, rhythm, volume, tone, and syntax  Mood: \"ok\"  Affect: euthymic, full range of affect, no lability, congruent to mood and situation  SI/HI: Denies SI and HI  Orientation: alert and oriented  Recent and Remote Memory: no gross impairment in immediate, recent, or remote memory  Attention " Span and Concentration:  Insight/Judgement into symptoms: good  Neurological Testing (MSSE Score and/or clock drawing): MMSE not performed during this encounter    SCREENINGS:      9/21/2023     8:30 AM 7/24/2024     9:00 AM 8/21/2024    10:00 AM   Depression Screen (PHQ-2/PHQ-9)   PHQ-2 Total Score 0 5 5   PHQ-9 Total Score  17 19         8/21/2024    10:23 AM 7/24/2024     9:34 AM 9/21/2023     8:45 AM    EDEL-7 ANXIETY SCALE FLOWSHEET   Feeling nervous, anxious, or on edge 2 3 3   Not being able to stop or control worrying 2 3 3   Worrying too much about different things 2 3 3   Trouble relaxing 3 3 3   Being so restless that it is hard to sit still 1 3 3   Becoming easily annoyed or irritable 3 1 3   Feeling afraid as if something awful might happen 2 3 3   EDEL-7 Total Score 15 19 21   How difficult have these problems made it for you to do your work, take care of things at home, or get along with other people? Very difficult         [place]  records   reviewed.  No concerns about misuse of controlled substance.    CURRENT RISK ASSESSMENT       Suicide: Not applicable       Homicide: Not applicable       Self-Harm: Not applicable       Relapse: Low       Crisis Safety Plan Reviewed Not Indicated    ASSESSMENT/DIAGNOSES/PLAN  Problem List Items Addressed This Visit          Psychiatry Problems    EDEL (generalized anxiety disorder)     Problem type: Chronic Illness with exacerbation, progression, or side effects of treatment    Assessment: History of EDEL with exacerbation in February/March 2024 after needing to put down emotional support animal. Unable to leave house without , has worsened depression and mood due to inability to do things. Improvement of symptoms, decrease derailment, better concentration and ability to control catastrophizing with TID dosing of pregabalin vs. QHS. Requests to increase.     Plan  Medication:   -INCREASE pregabalin 50mg TID p.o. to 75mg TID p.o. for anxiety.  -Continue  citalopram 40 mg p.o. daily for depression and anxiety   -Continue propranolol 10 mg p.o. twice daily to 3 times daily as needed for anxiety; consider cessation in future if anxiety better controlled with pregabalin, or titrating up on propranolol as needed dose for situational help for anxiety symptoms.  -Continue trazodone 50 mg p.o. nightly as needed for sleep     Therapy: Continue trazodone 50 mg p.o. nightly as needed for sleep     Other Orders: None          Relevant Medications    pregabalin (LYRICA) 75 MG Cap    pregabalin (LYRICA) 75 MG Cap (Start on 9/4/2024)    Current episode of major depressive disorder without prior episode     Problem type: Acute Illness    Assessment: Precipitated by passing of emotional support animal (GILBERTO) approximately 1 month prior: Improving Low mood, anhedonia, sense of guilt/helplessness, decreased energy, decreased concentration, decreased appetite.  Denied SI/HI.   Continue to follow-up, addressing symptoms. Symptoms worsened due to housebound status with anxiety without GILBERTO.     Plan  Medication: -Continue Celexa 40 mg daily for depression and anxiety as above.     Therapy: N/A    Other Orders: n/A             Other    Alcohol use disorder, severe, in sustained remission (HCC)     Problem type: Chronic Illness, Stable    Assessment: History of alcoholism: 750 milliliters of vodka a day.  Past symptoms of cravings, compulsion, poor consequences, deteriorating social relationships, dependence, and tolerance.  History of emergency department visits for detox.  Sustain sobriety since July 2023 after [facility] visit.      Plan  Medication: n/a    Therapy: n/a    Other Orders: n/a                Medication options, alternatives (including no medications) and medication risks/benefits/side effects were discussed in detail.  The patient was advised to call, message clinician on [employer], or come in to the clinic if symptoms worsen or if questions/issues regarding their medications arise.   The patient verbalized understanding and agreement.    The patient was educated to call 911, call the suicide hotline, or go to the local ER if having thoughts of suicide or homicide.  The patient verbalized understanding and agreement.   The proposed treatment plan was discussed with the patient who was provided the opportunity to ask questions and make suggestions regarding alternative treatment. Patient verbalized understanding and expressed agreement with the plan.      Follow up in 1-2 months for pregabalin titration for anxiety.      This appointment was supervised by attending psychiatrist, Ninfa Stahl M.D., who agrees with assessment and treatment plan.  See attending attestation for more details.

## 2024-08-21 NOTE — ASSESSMENT & PLAN NOTE
Problem type: Acute Illness    Assessment: Precipitated by passing of emotional support animal (GILBERTO) approximately 1 month prior: Improving Low mood, anhedonia, sense of guilt/helplessness, decreased energy, decreased concentration, decreased appetite.  Denied SI/HI.   Continue to follow-up, addressing symptoms. Symptoms worsened due to housebound status with anxiety without GILBERTO.     Plan  Medication: -Continue Celexa 40 mg daily for depression and anxiety as above.     Therapy: N/A    Other Orders: n/A

## 2024-08-23 ENCOUNTER — TELEPHONE (OUTPATIENT)
Dept: BEHAVIORAL HEALTH | Facility: PSYCHIATRIC FACILITY | Age: 44
End: 2024-08-23
Payer: COMMERCIAL

## 2024-08-23 DIAGNOSIS — F41.1 GAD (GENERALIZED ANXIETY DISORDER): ICD-10-CM

## 2024-08-23 RX ORDER — PREGABALIN 75 MG/1
75 CAPSULE ORAL 3 TIMES DAILY
Qty: 90 CAPSULE | Refills: 0 | Status: SHIPPED | OUTPATIENT
Start: 2024-09-04 | End: 2024-10-04

## 2024-08-23 RX ORDER — PREGABALIN 75 MG/1
75 CAPSULE ORAL 3 TIMES DAILY
Qty: 90 CAPSULE | Refills: 0 | Status: SHIPPED | OUTPATIENT
Start: 2024-09-03 | End: 2024-10-03

## 2024-08-23 NOTE — TELEPHONE ENCOUNTER
----- Message from Naomi LINO sent at 8/23/2024  1:58 PM PDT -----  Regarding: PT Med Issue  Dr. Carson's FLORENCIO number is not working with this pharmacy to get the med pregabalin (LYRICA) 75 MG Cap filled for this patient. Can you, Dr. Stahl, send in the meds for this patient since you are the attending?     Thank you!

## 2024-09-25 ENCOUNTER — TELEMEDICINE (OUTPATIENT)
Dept: BEHAVIORAL HEALTH | Facility: PSYCHIATRIC FACILITY | Age: 44
End: 2024-09-25
Payer: COMMERCIAL

## 2024-09-25 DIAGNOSIS — F41.1 GAD (GENERALIZED ANXIETY DISORDER): ICD-10-CM

## 2024-09-25 DIAGNOSIS — F32.0 CURRENT MILD EPISODE OF MAJOR DEPRESSIVE DISORDER WITHOUT PRIOR EPISODE (HCC): ICD-10-CM

## 2024-09-25 DIAGNOSIS — F10.21 ALCOHOL USE DISORDER, SEVERE, IN SUSTAINED REMISSION (HCC): ICD-10-CM

## 2024-09-25 PROCEDURE — 99214 OFFICE O/P EST MOD 30 MIN: CPT

## 2024-09-25 RX ORDER — PREGABALIN 100 MG/1
100 CAPSULE ORAL 3 TIMES DAILY
Qty: 90 CAPSULE | Refills: 0 | Status: SHIPPED | OUTPATIENT
Start: 2024-09-25 | End: 2024-10-25

## 2024-09-25 RX ORDER — PREGABALIN 100 MG/1
100 CAPSULE ORAL 3 TIMES DAILY
Qty: 90 CAPSULE | Refills: 0 | Status: CANCELLED | OUTPATIENT
Start: 2024-09-25 | End: 2024-10-25

## 2024-09-25 RX ORDER — CITALOPRAM HYDROBROMIDE 40 MG/1
40 TABLET ORAL DAILY
Qty: 90 TABLET | Refills: 3 | Status: SHIPPED | OUTPATIENT
Start: 2024-09-25

## 2024-09-25 RX ORDER — CITALOPRAM HYDROBROMIDE 40 MG/1
40 TABLET ORAL
Qty: 90 TABLET | Refills: 2 | Status: CANCELLED | OUTPATIENT
Start: 2024-09-25

## 2024-09-25 ASSESSMENT — ENCOUNTER SYMPTOMS
FEVER: 0
ABDOMINAL PAIN: 0
WEAKNESS: 0
CHILLS: 0
SHORTNESS OF BREATH: 0
SEIZURES: 0
BLURRED VISION: 0
TREMORS: 0
DIZZINESS: 0
DIARRHEA: 0
COUGH: 0
HEADACHES: 0

## 2024-09-25 NOTE — ASSESSMENT & PLAN NOTE
Problem type: Chronic Illness with exacerbation, progression, or side effects of treatment     Assessment: History of EDEL with exacerbation in February/March 2024 after needing to put down emotional support animal. Unable to leave house without , has worsened depression and mood due to inability to do things. Improvement of symptoms, decrease derailment, better concentration and ability to control catastrophizing with TID dosing of pregabalin vs. QHS. Requests to increase understands risk with concurrent use of trazodone for sleep. Goals to decrease anxiety to be able to visit family by being unable to get on highways ([place]).      Plan  Medication:   -INCREASE pregabalin 75mg TID p.o. to 100mg TID p.o. for anxiety.  -Continue citalopram 40 mg p.o. daily for depression and anxiety   -Continue propranolol 10 mg p.o. twice daily to 3 times daily as needed for anxiety; consider cessation in future if anxiety better controlled with pregabalin, or titrating up on propranolol as needed dose for situational help for anxiety symptoms.  -Continue trazodone 50 mg p.o. nightly as needed for sleep, consider cessation/taper in future due to CNS effects.     Therapy: Continue trazodone 50 mg p.o. nightly as needed for sleep      Other Orders: None

## 2024-09-25 NOTE — ASSESSMENT & PLAN NOTE
Problem type: Chronic Illness, Stable     Assessment: History of alcoholism: 750 milliliters of vodka a day.  Past symptoms of cravings, compulsion, poor consequences, deteriorating social relationships, dependence, and tolerance.  History of emergency department visits for detox.  Sustain sobriety since July 2023 after [facility] visit.        Plan  Medication: n/a     Therapy: n/a     Other Orders: n/a

## 2024-09-25 NOTE — ASSESSMENT & PLAN NOTE
Problem type: Acute Illness     Assessment: Precipitated by passing of emotional support animal (GILBERTO) approximately in March/February. Improving Low mood, anhedonia, sense of guilt/helplessness, decreased energy, decreased concentration, decreased appetite.  Denied SI/HI.   Continue to follow-up, addressing symptoms. Symptoms worsened due to housebound status with anxiety without GILBERTO. May consider switching celexa or tapering after EDEL more well controlled.     Plan  Medication: -Continue Celexa 40 mg daily for depression and anxiety as above.      Therapy: N/A     Other Orders: n/A

## 2024-09-25 NOTE — PROGRESS NOTES
"[facility] Outpatient Psychiatric Follow Up Note  Evaluation completed by: Evelia Carson D.O.   Date of Service: 09/25/2024  Appointment type: virtual/telepsychiatry appointment: This evaluation was conducted via Zoom using secure and encrypted videoconferencing technology. The patient was in their home in the [facility].   The patient's identity was confirmed and verbal consent was obtained for this virtual visit.  Attending:  Ninfa Stahl M.D.  Information below was collected from: patient    CHIEF COMPLIANT:  Medication Management (\"I think the medication is working\")      HPI:   Johanne Raya is a 44 y.o. old female who presents today for regularly scheduled follow up for assessment of Medication Management (\"I think the medication is working\")   has had 10 pounds of weight gain in past 3 weeks which could be related to high intake of sugar recently and in past year due to \"I'm really addicted to sugar right now.\" States pregabalin \"makes me a bit tired for a few hours, I feel like I have no energy and I just want to lay down but I'll take that over being unable to leave my house any day.\"  Denies dizziness/seizures/tremors.  takes medications at 8am, lunch, and 6pm.     wants to increase meds with goals to be able to decrease anxiety enough to travel on [facility] so she can visit family.     Spoke to patient the side effects of medication especially with continued use of trazodone could decrease central nervous system, patient states she has been getting better sleep and will try to take trazodone less often but understands the risk and would like to continue to keep medications all as is for now.  gets up ~2am as she works as a live  on [employer] and goes to bed ~6pm.  has adequate sleep and feels rested.    We spoke about increasing pregabalin from current 225mg/day to 300mg/day. Patient preference for TID continued dosing instead of BID. " "Will continue to monitor with possible switch to BID dosing if sedation becomes intolerable or if dizziness/other side effects start initiating.       PSYCHIATRIC REVIEW OF SYSTEMS:current symptoms as reported by pt.  Depression: improved Depressed mood, Anhedonia, and Sense of hopelessness  Jacey: Patient denies any change in mood, increased energy, or marked irritability  Anxiety/Panic Attacks: IMPROVED palpitations, sweating, chest pain, shortness of breath, racing thoughts, psychomotor agitation, feelings of losing control, difficulty concentrating  Trauma: Patient reports no signs or symptoms indicative of PTSD  Psychosis: Patient reports no signs or symptoms indicative of psychosis      CURRENT MEDICATIONS    Current Outpatient Medications:     pregabalin (LYRICA) 100 MG Cap, Take 1 Capsule by mouth in the morning, at noon, and at bedtime for 30 days., Disp: 90 Capsule, Rfl: 0    citalopram (CELEXA) 40 MG Tab, Take 1 Tablet by mouth every day., Disp: 90 Tablet, Rfl: 3    traZODone (DESYREL) 50 MG Tab, TAKE 1 TABLET BY MOUTH EVERYDAY AT BEDTIME, Disp: 90 Tablet, Rfl: 1    propranolol (INDERAL) 10 MG Tab, Take 1 Tablet by mouth 3 times a day., Disp: 90 Tablet, Rfl: 11     REVIEW OF SYSTEMS   Review of Systems   Constitutional:  Negative for chills and fever.   HENT:  Negative for hearing loss.    Eyes:  Negative for blurred vision.   Respiratory:  Negative for cough and shortness of breath.    Gastrointestinal:  Negative for abdominal pain and diarrhea.   Neurological:  Negative for dizziness, tremors, seizures, weakness and headaches.   Psychiatric/Behavioral:          \"More tired but not intolerable\"     Neurologic: no tics, tremors, dyskinesias. The patient denies dizzniess, syncope, falls. Ambulates independently    PAST MEDICAL HISTORY  Past Medical History:   Diagnosis Date    Alcohol abuse     Anxiety     Anxiety and depression     Depression     Snoring     no sleep study    Suicide attempt (HCC)  "     Allergies   Allergen Reactions    Nsaids      Other reaction(s): GI bleeding    Penicillins Rash     Past Surgical History:   Procedure Laterality Date    [place] LAP OCTAVIA RESTRICT PROC LONGITUDINAL GAS*  09/15/2021    Procedure: GASTRECTOMY, SLEEVE, LAPAROSCOPIC;  Surgeon: Regulo Hugo M.D.;  Location: SURGERY [facility];  Service: General    APPENDECTOMY      TUBAL LIGATION  2010    OTHER ORTHOPEDIC SURGERY Left 2010    foot surgery to remove extra bone    TUBAL COAGULATION LAPAROSCOPIC BILATERAL Bilateral 2009    PRIMARY C SECTION  2005        TONSILLECTOMY      as a child      Family History   Problem Relation Age of Onset    Pancreatic Cancer Father     Drug abuse Sister     Depression Sister     Schizophrenia Sister     Hodgkin's Lymphoma Maternal Grandfather      Social History     Socioeconomic History    Marital status:     Highest education level: 12th grade   Tobacco Use    Smoking status: Every Day     Current packs/day: 1.00     Average packs/day: 1 pack/day for 29.2 years (29.2 ttl pk-yrs)     Types: Cigarettes     Start date: 1995     Passive exposure: Current    Smokeless tobacco: Never   Vaping Use    Vaping status: Never Used   Substance and Sexual Activity    Alcohol use: Not Currently     Comment: when able to drink consumes 1/5    Drug use: Not Currently     Types: Inhaled     Comment: marijuana    Sexual activity: Yes     Partners: Male     Comment: Tubes tied in early 30s.   Social History Narrative    Social Determinants of Health    Alcohol Use: Alcohol Misuse (2023)        AUDIT-C            Frequency of Alcohol Consumption: Patient declined            Average Number of Drinks: 10 or more            Frequency of Binge Drinking: Patient declined    Depression: At risk (2024)        PHQ-2            PHQ-2 Score: 5    Financial Resource Strain: Patient Declined (2023)        Overall Financial Resource Strain (CARDIA)            Difficulty of Paying  Living Expenses: Patient declined    Food Insecurity: Patient Declined (9/20/2023)        Hunger Vital Sign            Worried About Running Out of Food in the Last Year: Patient declined            Ran Out of Food in the Last Year: Patient declined    Housing Stability: Low Risk  (9/20/2023)        Housing Stability Vital Sign            Unable to Pay for Housing in the Last Year: No            Number of Places Lived in the Last Year: 1            Unstable Housing in the Last Year: No    Intimate Partner Violence: Not on file    Physical Activity: Inactive (9/20/2023)        Exercise Vital Sign            Days of Exercise per Week: 0 days            Minutes of Exercise per Session: 0 min    Social Connections: Unknown (9/20/2023)        Social Connection and Isolation Panel [NHANES]            Frequency of Communication with Friends and Family: More than three times a week            Frequency of Social Gatherings with Friends and Family: Patient declined            Attends Amish Services: Patient declined            Active Member of Clubs or Organizations: No            Attends Club or Organization Meetings: More than 4 times per year            Marital Status:     Stress: Stress Concern Present (9/20/2023)        [employer] [place] [employer] - Occupational Stress Questionnaire            Feeling of Stress : Very much    Tobacco Use: Medium Risk (7/24/2024)        Patient History            Smoking Tobacco Use: Former            Smokeless Tobacco Use: Never            Passive Exposure: Not on file    Transportation Needs: No Transportation Needs (9/20/2023)        PRAPARE - Transportation            Lack of Transportation (Medical): No            Lack of Transportation (Non-Medical): No    Utilities: Not on file      Social Determinants of Health     Financial Resource Strain: Patient Declined (9/20/2023)    Overall Financial Resource Strain (CARDIA)     Difficulty of Paying Living Expenses:  Patient declined   Food Insecurity: Patient Declined (2023)    Hunger Vital Sign     Worried About Running Out of Food in the Last Year: Patient declined     Ran Out of Food in the Last Year: Patient declined   Transportation Needs: No Transportation Needs (2023)    PRAPARE - Transportation     Lack of Transportation (Medical): No     Lack of Transportation (Non-Medical): No   Physical Activity: Inactive (2023)    Exercise Vital Sign     Days of Exercise per Week: 0 days     Minutes of Exercise per Session: 0 min   Stress: Stress Concern Present (2023)    [place] [employer] Occupational [employer] - Occupational Stress Questionnaire     Feeling of Stress : Very much   Social Connections: Unknown (2023)    Social Connection and Isolation Panel [NHANES]     Frequency of Communication with Friends and Family: More than three times a week     Frequency of Social Gatherings with Friends and Family: Patient declined     Attends Restorationist Services: Patient declined     Active Member of Clubs or Organizations: No     Attends Club or Organization Meetings: More than 4 times per year     Marital Status:    Housing Stability: Low Risk  (2023)    Housing Stability Vital Sign     Unable to Pay for Housing in the Last Year: No     Number of Places Lived in the Last Year: 1     Unstable Housing in the Last Year: No     Past Surgical History:   Procedure Laterality Date    [place] LAP OCTAVIA RESTRICT PROC LONGITUDINAL GAS*  09/15/2021    Procedure: GASTRECTOMY, SLEEVE, LAPAROSCOPIC;  Surgeon: Regulo Hugo M.D.;  Location: SURGERY [facility];  Service: General    APPENDECTOMY  2014    TUBAL LIGATION  2010    OTHER ORTHOPEDIC SURGERY Left 2010    foot surgery to remove extra bone    TUBAL COAGULATION LAPAROSCOPIC BILATERAL Bilateral 2009    PRIMARY C SECTION  2005        TONSILLECTOMY      as a child       PSYCHIATRIC EXAMINATION   There were no vitals taken for this  "visit.  Musculoskeletal:  telemedicine visit  Appearance:  telemedicine visit  Thought Process:  linear, coherent, goal-oriented, and organized  Abnormal or Psychotic Thoughts: No evidence of auditory or visual hallucinations, and no overt delusions noted  Speech: regular rate, rhythm, volume, tone, and syntax and coherent  Mood: \"good\"  Affect: euthymic, congruent with mood, and full range of affect, non-labile.  SI/HI: Denies SI and HI  Orientation: alert and oriented  Recent and Remote Memory: no gross impairment in immediate, recent, or remote memory  Attention Span and Concentration: appropriate for age and interaction  Insight/Judgement into symptoms: good  Neurological Testing (MSSE Score and/or clock drawing): MMSE not performed during this encounter    SCREENINGS:      9/21/2023     8:30 AM 7/24/2024     9:00 AM 8/21/2024    10:00 AM   Depression Screen (PHQ-2/PHQ-9)   PHQ-2 Total Score 0 5 5   PHQ-9 Total Score  17 19         8/21/2024    10:23 AM 7/24/2024     9:34 AM 9/21/2023     8:45 AM    EDEL-7 ANXIETY SCALE FLOWSHEET   Feeling nervous, anxious, or on edge 2 3 3   Not being able to stop or control worrying 2 3 3   Worrying too much about different things 2 3 3   Trouble relaxing 3 3 3   Being so restless that it is hard to sit still 1 3 3   Becoming easily annoyed or irritable 3 1 3   Feeling afraid as if something awful might happen 2 3 3   EDEL-7 Total Score 15 19 21   How difficult have these problems made it for you to do your work, take care of things at home, or get along with other people? Very difficult         [place]  records   reviewed.  No concerns about misuse of controlled substance.    CURRENT RISK ASSESSMENT       Suicide: Not applicable       Homicide: Not applicable       Self-Harm: Not applicable       Relapse: Low       Crisis Safety Plan Reviewed Not Indicated    ASSESSMENT/DIAGNOSES/PLAN  Problem List Items Addressed This Visit          Psychiatry Problems    EDEL (generalized " anxiety disorder)     Problem type: Chronic Illness with exacerbation, progression, or side effects of treatment     Assessment: History of EDEL with exacerbation in February/March 2024 after needing to put down emotional support animal. Unable to leave house without , has worsened depression and mood due to inability to do things. Improvement of symptoms, decrease derailment, better concentration and ability to control catastrophizing with TID dosing of pregabalin vs. QHS. Requests to increase understands risk with concurrent use of trazodone for sleep. Goals to decrease anxiety to be able to visit family by being [place] get on highways ([place]      Plan  Medication:   -INCREASE pregabalin 75mg TID p.o. to 100mg TID p.o. for anxiety.  -Continue citalopram 40 mg p.o. daily for depression and anxiety   -Continue propranolol 10 mg p.o. twice daily to 3 times daily as needed for anxiety; consider cessation in future if anxiety better controlled with pregabalin, or titrating up on propranolol as needed dose for situational help for anxiety symptoms.  -Continue trazodone 50 mg p.o. nightly as needed for sleep, consider cessation/taper in future due to CNS effects.     Therapy: Continue trazodone 50 mg p.o. nightly as needed for sleep      Other Orders: None          Relevant Medications    pregabalin (LYRICA) 100 MG Cap    citalopram (CELEXA) 40 MG Tab    Current episode of major depressive disorder without prior episode     Problem type: Acute Illness     Assessment: Precipitated by passing of emotional support animal (GILBERTO) approximately in March/February. Improving Low mood, anhedonia, sense of guilt/helplessness, decreased energy, decreased concentration, decreased appetite.  Denied SI/HI.   Continue to follow-up, addressing symptoms. Symptoms worsened due to housebound status with anxiety without GILBERTO. May consider switching celexa or tapering after EDEL more well controlled.     Plan  Medication:  -Continue Celexa 40 mg daily for depression and anxiety as above.      Therapy: N/A     Other Orders: n/A          Relevant Medications    citalopram (CELEXA) 40 MG Tab       Other    Alcohol use disorder, severe, in sustained remission (HCC)     Problem type: Chronic Illness, Stable     Assessment: History of alcoholism: 750 milliliters of vodka a day.  Past symptoms of cravings, compulsion, poor consequences, deteriorating social relationships, dependence, and tolerance.  History of emergency department visits for detox.  Sustain sobriety since July 2023 after [facility] visit.        Plan  Medication: n/a     Therapy: n/a     Other Orders: n/a                Medication options, alternatives (including no medications) and medication risks/benefits/side effects were discussed in detail.  The patient was advised to call, message clinician on [facility], or come in to the clinic if symptoms worsen or if questions/issues regarding their medications arise.  The patient verbalized understanding and agreement.    The patient was educated to call 911, call the suicide hotline, or go to the local ER if having thoughts of suicide or homicide.  The patient verbalized understanding and agreement.   The proposed treatment plan was discussed with the patient who was provided the opportunity to ask questions and make suggestions regarding alternative treatment. Patient verbalized understanding and expressed agreement with the plan.      Follow up in 4-6 weeks for pregabalin titration, possible future taper of trazodone/celexa.      This appointment was supervised by attending psychiatrist, Ninfa Stahl M.D., who agrees with assessment and treatment plan.  See attending attestation for more details.

## 2024-10-22 DIAGNOSIS — F41.9 ANXIETY: ICD-10-CM

## 2024-10-22 DIAGNOSIS — F41.1 GAD (GENERALIZED ANXIETY DISORDER): ICD-10-CM

## 2024-10-22 RX ORDER — PROPRANOLOL HYDROCHLORIDE 10 MG/1
10 TABLET ORAL 3 TIMES DAILY
Qty: 270 TABLET | Refills: 3 | Status: SHIPPED | OUTPATIENT
Start: 2024-10-22 | End: 2024-10-30

## 2024-10-23 RX ORDER — PREGABALIN 100 MG/1
100 CAPSULE ORAL 3 TIMES DAILY
Qty: 90 CAPSULE | Refills: 0 | Status: SHIPPED | OUTPATIENT
Start: 2024-10-23 | End: 2024-11-22

## 2024-10-30 ENCOUNTER — TELEMEDICINE (OUTPATIENT)
Dept: BEHAVIORAL HEALTH | Facility: PSYCHIATRIC FACILITY | Age: 44
End: 2024-10-30
Payer: COMMERCIAL

## 2024-10-30 DIAGNOSIS — F41.1 GAD (GENERALIZED ANXIETY DISORDER): ICD-10-CM

## 2024-10-30 DIAGNOSIS — F10.21 ALCOHOL USE DISORDER, SEVERE, IN SUSTAINED REMISSION (HCC): ICD-10-CM

## 2024-10-30 DIAGNOSIS — F06.4 ANXIETY DISORDER DUE TO KNOWN PHYSIOLOGICAL CONDITION: ICD-10-CM

## 2024-10-31 ASSESSMENT — ENCOUNTER SYMPTOMS
DIZZINESS: 0
TREMORS: 0
DEPRESSION: 0
VOMITING: 0
HALLUCINATIONS: 0
INSOMNIA: 0
HEADACHES: 0
ABDOMINAL PAIN: 0
HEARTBURN: 0
NERVOUS/ANXIOUS: 1

## 2024-10-31 ASSESSMENT — LIFESTYLE VARIABLES: SUBSTANCE_ABUSE: 0

## 2024-11-27 ENCOUNTER — TELEMEDICINE (OUTPATIENT)
Dept: BEHAVIORAL HEALTH | Facility: PSYCHIATRIC FACILITY | Age: 44
End: 2024-11-27
Payer: COMMERCIAL

## 2024-11-27 DIAGNOSIS — F41.1 GAD (GENERALIZED ANXIETY DISORDER): ICD-10-CM

## 2024-11-27 DIAGNOSIS — F10.21 ALCOHOL USE DISORDER, SEVERE, IN SUSTAINED REMISSION (HCC): ICD-10-CM

## 2024-11-27 DIAGNOSIS — F32.0 CURRENT MILD EPISODE OF MAJOR DEPRESSIVE DISORDER WITHOUT PRIOR EPISODE (HCC): ICD-10-CM

## 2024-11-27 DIAGNOSIS — F40.00 AGORAPHOBIA: ICD-10-CM

## 2024-11-27 PROBLEM — F06.4 ANXIETY DISORDER DUE TO KNOWN PHYSIOLOGICAL CONDITION: Status: RESOLVED | Noted: 2024-05-15 | Resolved: 2024-11-27

## 2024-11-27 RX ORDER — CITALOPRAM HYDROBROMIDE 40 MG/1
40 TABLET ORAL DAILY
Qty: 90 TABLET | Refills: 3 | Status: SHIPPED | OUTPATIENT
Start: 2024-11-27 | End: 2025-11-22

## 2024-11-27 RX ORDER — PREGABALIN 100 MG/1
100 CAPSULE ORAL 3 TIMES DAILY
Qty: 90 CAPSULE | Refills: 0 | Status: SHIPPED | OUTPATIENT
Start: 2025-01-22 | End: 2025-02-21

## 2024-11-27 RX ORDER — TRAZODONE HYDROCHLORIDE 50 MG/1
50 TABLET, FILM COATED ORAL NIGHTLY PRN
Qty: 20 TABLET | Refills: 4 | Status: SHIPPED | OUTPATIENT
Start: 2024-11-27

## 2024-11-27 RX ORDER — PREGABALIN 100 MG/1
100 CAPSULE ORAL 3 TIMES DAILY
Qty: 90 CAPSULE | Refills: 0 | Status: SHIPPED | OUTPATIENT
Start: 2024-11-27 | End: 2024-12-27

## 2024-11-27 RX ORDER — PREGABALIN 100 MG/1
100 CAPSULE ORAL 3 TIMES DAILY
COMMUNITY
Start: 2024-11-27

## 2024-11-27 RX ORDER — PREGABALIN 100 MG/1
100 CAPSULE ORAL 3 TIMES DAILY
Qty: 90 CAPSULE | Refills: 0 | Status: SHIPPED | OUTPATIENT
Start: 2024-12-18 | End: 2025-01-17

## 2024-11-27 ASSESSMENT — ENCOUNTER SYMPTOMS
NERVOUS/ANXIOUS: 1
HEADACHES: 0
INSOMNIA: 0
DEPRESSION: 1
HALLUCINATIONS: 0
TREMORS: 0
DIZZINESS: 0

## 2024-11-27 ASSESSMENT — PATIENT HEALTH QUESTIONNAIRE - PHQ9
CLINICAL INTERPRETATION OF PHQ2 SCORE: 3
5. POOR APPETITE OR OVEREATING: 3 - NEARLY EVERY DAY
SUM OF ALL RESPONSES TO PHQ QUESTIONS 1-9: 9

## 2024-11-27 ASSESSMENT — ANXIETY QUESTIONNAIRES
GAD7 TOTAL SCORE: 9
7. FEELING AFRAID AS IF SOMETHING AWFUL MIGHT HAPPEN: SEVERAL DAYS
IF YOU CHECKED OFF ANY PROBLEMS ON THIS QUESTIONNAIRE, HOW DIFFICULT HAVE THESE PROBLEMS MADE IT FOR YOU TO DO YOUR WORK, TAKE CARE OF THINGS AT HOME, OR GET ALONG WITH OTHER PEOPLE: VERY DIFFICULT
1. FEELING NERVOUS, ANXIOUS, OR ON EDGE: MORE THAN HALF THE DAYS
4. TROUBLE RELAXING: SEVERAL DAYS
5. BEING SO RESTLESS THAT IT IS HARD TO SIT STILL: SEVERAL DAYS
2. NOT BEING ABLE TO STOP OR CONTROL WORRYING: MORE THAN HALF THE DAYS
3. WORRYING TOO MUCH ABOUT DIFFERENT THINGS: SEVERAL DAYS
6. BECOMING EASILY ANNOYED OR IRRITABLE: SEVERAL DAYS

## 2024-11-27 ASSESSMENT — LIFESTYLE VARIABLES: SUBSTANCE_ABUSE: 0

## 2024-11-27 NOTE — ASSESSMENT & PLAN NOTE
Problem type: Chronic Illness, Stable     Assessment: Precipitated by passing of emotional support animal (GILBERTO) approximately in March/February 2024. Improving Low mood, anhedonia, sense of guilt/helplessness, decreased energy, decreased concentration, decreased appetite.  Denied SI/HI.   Continue to follow-up, addressing symptoms. Symptoms worsened due to housebound status with anxiety without GILBERTO. May consider switching celexa (although patient states well controlled right now) or tapering after EDEL more well controlled.     Plan  Medication: -Continue Celexa 40 mg daily for depression and anxiety as above.      Therapy: N/A     Other Orders: n/A

## 2024-11-27 NOTE — ASSESSMENT & PLAN NOTE
Problem type: Chronic Illness, Stable    Assessment: Has been doing well with pregabalin, states has been able to go outside for 6-7 hours versus 10-20 minutes before pregabalin initiation.  Prefers pregabalin to be dosed 3 times a day instead of extended release.    Plan  Medication: Pregabalin 100 mg 3 times a day (8:30 AM, 1 PM, 6 PM is when patient takes it)    Therapy: Not applicable, continued to recommend therapy    Other Orders: Not applicable

## 2024-11-27 NOTE — ASSESSMENT & PLAN NOTE
Problem type: Chronic Illness, Stable     Assessment: History of alcoholism: 750 milliliters of vodka a day.  Past symptoms of cravings, compulsion, poor consequences, deteriorating social relationships, dependence, and tolerance.  History of emergency department visits for detox.  Sustain sobriety since July 2023 after [facility] visit.  States she is not craving alcohol now.        Plan  Medication:   -Pregabalin was initiated for general anxiety disorder with agoraphobia.  However, it may be helping patient with her alcohol use disorder.  Patient states she is not craving any alcohol now.     Therapy: n/a     Other Orders: n/a

## 2024-11-27 NOTE — ASSESSMENT & PLAN NOTE
Problem type: Chronic Illness with exacerbation, progression, or side effects of treatment     Assessment: History of EDEL with exacerbation in February/March 2024 after needing to put down emotional support animal. Used to unable to leave house without , has worsened depression and mood due to inability to do things. Improvement of symptoms, decrease derailment, better concentration and ability to control catastrophizing with TID dosing of pregabalin vs. QHS. Goals to decrease anxiety to be able to visit family by being unable to get on highways ([place]), and increased time being able to sitting, driving on the highway to be able to go to clinic in person for future policy change for controlled substances in [facility].  Patient has been able to go outside for 6-7 hours, versus 10-20 minutes.  However, still has lots of anxiety about leaving house.  We will continue to monitor.  Current doses of pregabalin are working well right now.       Plan  Medication:   -Continue pregabalin 100mg TID p.o. for anxiety.   -Continue citalopram 40 mg p.o. daily for depression and anxiety   -Continue propranolol 10 mg p.o. twice daily to 3 times daily as needed for anxiety; consider cessation in future if anxiety better controlled with pregabalin, or titrating up on propranolol as needed dose for situational help for anxiety symptoms.  -Continue trazodone 50 mg p.o. nightly as needed for sleep, consider cessation/taper in future due to CNS effects.     Therapy:   Patient was against therapy, states that had bad prior experiences.  Will continue to reiterate the many of these symptoms anxiety/medications treating are not related to some of the symptoms patient complains about.  I.e: Spiraling thoughts.  Initiated patient to write down anxious/mood log and time of taking medication to further advocate dosage of medication for anxiety, we will continue to reiterate patient to write down mood log.     Other Orders: None

## 2024-11-27 NOTE — PROGRESS NOTES
"[facility] Outpatient Psychiatric Follow Up Note  Evaluation completed by: Evelia Carson D.O.   Date of Service: 11/27/2024  Appointment type: in-office appointment.  Attending:  Ninfa Stahl M.D.  Information below was collected from: patient    CHIEF COMPLIANT:  Medication Management (\"I've been good\"/)        HPI:   Johanne Raya is a 44 y.o. old female who presents today for regularly scheduled follow up for assessment of Medication Management (\"I've been good\"/)  States anxiety is well managed, states can stay longer \"almost like 6 or 7 hours from 10-20 minutes.\"  States take propranolol 10mg approximately 15x/month.   States trazodone 50mg 2/week, sleep has been \"a lot better.\" States has 7-8 hours of sleep; energy \"a lot better\" been doing a lot better doesn't know why. Spoke about melatonin and how it may help with sleep to wean off trazodone.  Spoke about light therapy, and how it may be beneficial in management worsened mood during winter.     Takes pregabalin: 830, 1, 6pm. Well-managed anxiety and performance to take 3 times a day versus extended release.    Denies any side effects noticeable.  Is doing well with trying to wean off of the trazodone as taking trazodone and pregabalin can have increased risk for CNS depressant.    Patient is not worried about relapsing during the holiday season, as she states that she has no cravings.  This may be due to the pregabalin of 300 mg/day.      PSYCHIATRIC REVIEW OF SYSTEMS:current symptoms as reported by pt.  Depression: Depressed mood and Higher than normal appetite for sugar  Jacey: Patient denies any change in mood, increased energy, or marked irritability  Anxiety/Panic Attacks: palpitations, sweating, chest pain, shortness of breath, dizziness, racing thoughts, feelings of losing control, difficulty concentrating, only about thinking about traveling on the highway.  Trauma: Patient reports no signs or symptoms indicative of " PTSD  Psychosis: Patient reports no signs or symptoms indicative of psychosis    CURRENT MEDICATIONS    Current Outpatient Medications:     pregabalin (LYRICA) 100 MG Cap, Take 100 mg by mouth in the morning, at noon, and at bedtime., Disp: , Rfl:     citalopram (CELEXA) 40 MG Tab, Take 1 Tablet by mouth every day for 360 days., Disp: 90 Tablet, Rfl: 3    traZODone (DESYREL) 50 MG Tab, Take 1 Tablet by mouth at bedtime as needed for Sleep (Try Melatonin 2-3 hours before sleep before trazodone.)., Disp: 20 Tablet, Rfl: 4    pregabalin (LYRICA) 100 MG Cap, Take 1 Capsule by mouth in the morning, at noon, and at bedtime for 30 days., Disp: 90 Capsule, Rfl: 0    [START ON 12/18/2024] pregabalin (LYRICA) 100 MG Cap, Take 1 Capsule by mouth in the morning, at noon, and at bedtime for 30 days., Disp: 90 Capsule, Rfl: 0    [START ON 1/22/2025] pregabalin (LYRICA) 100 MG Cap, Take 1 Capsule by mouth in the morning, at noon, and at bedtime for 30 days., Disp: 90 Capsule, Rfl: 0     REVIEW OF SYSTEMS   Review of Systems   Neurological:  Negative for dizziness, tremors and headaches.   Psychiatric/Behavioral:  Positive for depression (Much more improved). Negative for hallucinations, substance abuse and suicidal ideas. The patient is nervous/anxious (Much more improved). The patient does not have insomnia.      Neurologic: no tics, tremors, dyskinesias. The patient denies dizzniess, syncope, falls. Ambulates independently    PAST MEDICAL HISTORY  Past Medical History:   Diagnosis Date    Alcohol abuse     Anxiety     Anxiety and depression     Depression     Snoring     no sleep study    Suicide attempt (HCC)      Allergies   Allergen Reactions    Nsaids      Other reaction(s): GI bleeding    Penicillins Rash     Past Surgical History:   Procedure Laterality Date    [place] LAP OCTAVIA RESTRICT PROC LONGITUDINAL GAS*  09/15/2021    Procedure: GASTRECTOMY, SLEEVE, LAPAROSCOPIC;  Surgeon: Regulo Hugo M.D.;  Location: SURGERY [facility]  [facility];  Service: General    APPENDECTOMY  2014    TUBAL LIGATION  2010    OTHER ORTHOPEDIC SURGERY Left 2010    foot surgery to remove extra bone    TUBAL COAGULATION LAPAROSCOPIC BILATERAL Bilateral 2009    PRIMARY C SECTION  2005        TONSILLECTOMY      as a child      Family History   Problem Relation Age of Onset    Pancreatic Cancer Father     Drug abuse Sister     Depression Sister     Schizophrenia Sister     Hodgkin's Lymphoma Maternal Grandfather      Social History     Socioeconomic History    Marital status:     Highest education level: 12th grade   Tobacco Use    Smoking status: Every Day     Current packs/day: 1.00     Average packs/day: 1 pack/day for 29.4 years (29.4 ttl pk-yrs)     Types: Cigarettes     Start date: 1995     Passive exposure: Current    Smokeless tobacco: Never   Vaping Use    Vaping status: Never Used   Substance and Sexual Activity    Alcohol use: Not Currently     Comment: when able to drink consumes 1/5    Drug use: Not Currently     Types: Inhaled     Comment: marijuana    Sexual activity: Yes     Partners: Male     Comment: Tubes tied in early 30s.   Social History Narrative    Social Determinants of Health    Alcohol Use: Alcohol Misuse (2023)        AUDIT-C            Frequency of Alcohol Consumption: Patient declined            Average Number of Drinks: 10 or more            Frequency of Binge Drinking: Patient declined    Depression: At risk (2024)        PHQ-2            PHQ-2 Score: 5    Financial Resource Strain: Patient Declined (2023)        Overall Financial Resource Strain (CARDIA)            Difficulty of Paying Living Expenses: Patient declined    Food Insecurity: Patient Declined (2023)        Hunger Vital Sign            Worried About Running Out of Food in the Last Year: Patient declined            Ran Out of Food in the Last Year: Patient declined    Housing Stability: Low Risk  (2023)        Housing Stability  Vital Sign            Unable to Pay for Housing in the Last Year: No            Number of Places Lived in the Last Year: 1            Unstable Housing in the Last Year: No    Intimate Partner Violence: Not on file    Physical Activity: Inactive (9/20/2023)        Exercise Vital Sign            Days of Exercise per Week: 0 days            Minutes of Exercise per Session: 0 min    Social Connections: Unknown (9/20/2023)        Social Connection and Isolation Panel [NHANES]            Frequency of Communication with Friends and Family: More than three times a week            Frequency of Social Gatherings with Friends and Family: Patient declined            Attends Episcopal Services: Patient declined            Active Member of Clubs or Organizations: No            Attends Club or Organization Meetings: More than 4 times per year            Marital Status:     Stress: Stress Concern Present (9/20/2023)        [place] of Occupational Health - Occupational Stress Questionnaire            Feeling of Stress : Very much    Tobacco Use: Medium Risk (7/24/2024)        Patient History            Smoking Tobacco Use: Former            Smokeless Tobacco Use: Never            Passive Exposure: Not on file    Transportation Needs: No Transportation Needs (9/20/2023)        PRAPARE - Transportation            Lack of Transportation (Medical): No            Lack of Transportation (Non-Medical): No    Utilities: Not on file      Social Drivers of Health     Financial Resource Strain: Patient Declined (9/20/2023)    Overall Financial Resource Strain (CARDIA)     Difficulty of Paying Living Expenses: Patient declined   Food Insecurity: Patient Declined (9/20/2023)    Hunger Vital Sign     Worried About Running Out of Food in the Last Year: Patient declined     Ran Out of Food in the Last Year: Patient declined   Transportation Needs: No Transportation Needs (9/20/2023)    PRAPARE - Transportation     Lack of  Transportation (Medical): No     Lack of Transportation (Non-Medical): No   Physical Activity: Inactive (2023)    Exercise Vital Sign     Days of Exercise per Week: 0 days     Minutes of Exercise per Session: 0 min   Stress: Stress Concern Present (2023)    Citizen of [place] of Occupational Health - Occupational Stress Questionnaire     Feeling of Stress : Very much   Social Connections: Unknown (2023)    Social Connection and Isolation Panel [NHANES]     Frequency of Communication with Friends and Family: More than three times a week     Frequency of Social Gatherings with Friends and Family: Patient declined     Attends Adventist Services: Patient declined     Active Member of Clubs or Organizations: No     Attends Club or Organization Meetings: More than 4 times per year     Marital Status:    Housing Stability: Low Risk  (2023)    Housing Stability Vital Sign     Unable to Pay for Housing in the Last Year: No     Number of Places Lived in the Last Year: 1     Unstable Housing in the Last Year: No     Past Surgical History:   Procedure Laterality Date    [place] LAP OCTAVIA RESTRICT PROC LONGITUDINAL GAS*  09/15/2021    Procedure: GASTRECTOMY, SLEEVE, LAPAROSCOPIC;  Surgeon: Regulo Hugo M.D.;  Location: SURGERY [facility];  Service: General    APPENDECTOMY  2014    TUBAL LIGATION  2010    OTHER ORTHOPEDIC SURGERY Left 2010    foot surgery to remove extra bone    TUBAL COAGULATION LAPAROSCOPIC BILATERAL Bilateral 2009    PRIMARY C SECTION  2005        TONSILLECTOMY      as a child       PSYCHIATRIC EXAMINATION   There were no vitals taken for this visit.  Musculoskeletal: No abnormal movements noted  Appearance: well-developed, well-nourished, and appears stated age, cooperative, engaged, and friendly  Thought Process:  linear, coherent, and goal-oriented  Abnormal or Psychotic Thoughts: No evidence of auditory or visual hallucinations, and no overt delusions noted  Speech:  "regular rate, rhythm, volume, tone, and syntax  Mood: \"ok\"  Affect: euthymic and congruent with mood  SI/HI: Denies SI and HI  Orientation: alert and oriented  Recent and Remote Memory: no gross impairment in immediate, recent, or remote memory  Attention Span and Concentration: Appropriate  Insight/Judgement into symptoms: fair  Neurological Testing (MSSE Score and/or clock drawing): MMSE not performed during this encounter    SCREENINGS:      7/24/2024     9:00 AM 8/21/2024    10:00 AM 11/27/2024    10:30 AM   Depression Screen (PHQ-2/PHQ-9)   PHQ-2 Total Score 5 5 3   PHQ-9 Total Score 17 19 9         11/27/2024    11:07 AM 8/21/2024    10:23 AM 7/24/2024     9:34 AM 9/21/2023     8:45 AM    EDEL-7 ANXIETY SCALE FLOWSHEET   Feeling nervous, anxious, or on edge 2 2 3 3   Not being able to stop or control worrying 2 2 3 3   Worrying too much about different things 1 2 3 3   Trouble relaxing 1 3 3 3   Being so restless that it is hard to sit still 1 1 3 3   Becoming easily annoyed or irritable 1 3 1 3   Feeling afraid as if something awful might happen 1 2 3 3   EDEL-7 Total Score 9 15 19 21   How difficult have these problems made it for you to do your work, take care of things at home, or get along with other people? Very difficult Very difficult           [place]  records   reviewed.  No concerns about misuse of controlled substance.    CURRENT RISK ASSESSMENT       Suicide: Not applicable       Homicide: Not applicable       Self-Harm: Not applicable       Relapse: Low       Crisis Safety Plan Reviewed Not Indicated    ASSESSMENT/DIAGNOSES/PLAN  Problem List Items Addressed This Visit          Psychiatry Problems    EDEL (generalized anxiety disorder)     Problem type: Chronic Illness with exacerbation, progression, or side effects of treatment     Assessment: History of EDEL with exacerbation in February/March 2024 after needing to put down emotional support animal. Used to unable to leave house without " , has worsened depression and mood due to inability to do things. Improvement of symptoms, decrease derailment, better concentration and ability to control catastrophizing with TID dosing of pregabalin vs. QHS. Goals to decrease anxiety to be able to visit family by being unable to get on highways ([place]), and increased time being able to sitting, driving on the highway to be able to go to clinic in person for future policy change for controlled [facility] in [facility].  Patient has been able to go outside for 6-7 hours, versus 10-20 minutes.  However, still has lots of anxiety about leaving house.  We will continue to monitor.  Current doses of pregabalin are working well right now.       Plan  Medication:   -Continue pregabalin 100mg TID p.o. for anxiety.   -Continue citalopram 40 mg p.o. daily for depression and anxiety   -Continue propranolol 10 mg p.o. twice daily to 3 times daily as needed for anxiety; consider cessation in future if anxiety better controlled with pregabalin, or titrating up on propranolol as needed dose for situational help for anxiety symptoms.  -Continue trazodone 50 mg p.o. nightly as needed for sleep, consider cessation/taper in future due to CNS effects.     Therapy:   Patient was against therapy, states that had bad prior experiences.  Will continue to reiterate the many of these symptoms anxiety/medications treating are not related to some of the symptoms patient complains about.  I.e: Spiraling thoughts.  Initiated patient to write down anxious/mood log and time of taking medication to further advocate dosage of medication for anxiety, we will continue to reiterate patient to write down mood log.     Other Orders: None             Relevant Medications    citalopram (CELEXA) 40 MG Tab    traZODone (DESYREL) 50 MG Tab    pregabalin (LYRICA) 100 MG Cap    pregabalin (LYRICA) 100 MG Cap (Start on 12/18/2024)    pregabalin (LYRICA) 100 MG Cap (Start on 1/22/2025)    Current  episode of major depressive disorder without prior episode     Problem type: Chronic Illness, Stable     Assessment: Precipitated by passing of emotional support animal (GILBERTO) approximately in March/February 2024. Improving Low mood, anhedonia, sense of guilt/helplessness, decreased energy, decreased concentration, decreased appetite.  Denied SI/HI.   Continue to follow-up, addressing symptoms. Symptoms worsened due to housebound status with anxiety without GILBERTO. May consider switching celexa (although patient states well controlled right now) or tapering after EDEL more well controlled.     Plan  Medication: -Continue Celexa 40 mg daily for depression and anxiety as above.      Therapy: N/A     Other Orders: n/A          Relevant Medications    citalopram (CELEXA) 40 MG Tab    traZODone (DESYREL) 50 MG Tab    Agoraphobia     Problem type: Chronic Illness, Stable    Assessment: Has been doing well with pregabalin, states has been able to go outside for 6-7 hours versus 10-20 minutes before pregabalin initiation.  Prefers pregabalin to be dosed 3 times a day instead of extended release.    Plan  Medication: Pregabalin 100 mg 3 times a day (8:30 AM, 1 PM, 6 PM is when patient takes it)    Therapy: Not applicable, continued to recommend therapy    Other Orders: Not applicable         Relevant Medications    citalopram (CELEXA) 40 MG Tab    traZODone (DESYREL) 50 MG Tab    pregabalin (LYRICA) 100 MG Cap    pregabalin (LYRICA) 100 MG Cap (Start on 12/18/2024)    pregabalin (LYRICA) 100 MG Cap (Start on 1/22/2025)       Other    Alcohol use disorder, severe, in sustained remission (HCC)     Problem type: Chronic Illness, Stable     Assessment: History of alcoholism: 750 milliliters of vodka a day.  Past symptoms of cravings, compulsion, poor consequences, deteriorating social relationships, dependence, and tolerance.  History of emergency department visits for detox.  Sustain sobriety since July 2023 after [facility] visit.  States  she is not craving alcohol now.        Plan  Medication:   -Pregabalin was initiated for general anxiety disorder with agoraphobia.  However, it may be helping patient with her alcohol use disorder.  Patient states she is not craving any alcohol now.     Therapy: n/a     Other Orders: n/a                Medication options, alternatives (including no medications) and medication risks/benefits/side effects were discussed in detail.  The patient was advised to call, message clinician on [facility], or come in to the clinic if symptoms worsen or if questions/issues regarding their medications arise.  The patient verbalized understanding and agreement.    The patient was educated to call 911, call the suicide hotline, or go to the local ER if having thoughts of suicide or homicide.  The patient verbalized understanding and agreement.   The proposed treatment plan was discussed with the patient who was provided the opportunity to ask questions and make suggestions regarding alternative treatment. Patient verbalized understanding and expressed agreement with the plan.      Follow-up in 1-2 months, or sooner if needed currently well-controlled with pregabalin 100 mg 3 times daily, Celexa 40 mg daily, only uses propranolol 15 times/month, and trazodone approximately 2-3 times a week.  We will continue to try to wean down/off trazodone due to risk for CNS depressant with pregabalin.      This appointment was supervised by attending psychiatrist, Ninfa Stahl M.D., who agrees with assessment and treatment plan.  See attending attestation for more details.

## 2024-12-18 ENCOUNTER — TELEMEDICINE (OUTPATIENT)
Dept: BEHAVIORAL HEALTH | Facility: PSYCHIATRIC FACILITY | Age: 44
End: 2024-12-18
Payer: COMMERCIAL

## 2024-12-18 DIAGNOSIS — F41.1 GAD (GENERALIZED ANXIETY DISORDER): ICD-10-CM

## 2024-12-18 DIAGNOSIS — F32.0 CURRENT MILD EPISODE OF MAJOR DEPRESSIVE DISORDER WITHOUT PRIOR EPISODE (HCC): ICD-10-CM

## 2024-12-18 DIAGNOSIS — F10.21 ALCOHOL USE DISORDER, SEVERE, IN SUSTAINED REMISSION (HCC): ICD-10-CM

## 2024-12-18 DIAGNOSIS — F40.00 AGORAPHOBIA: ICD-10-CM

## 2024-12-18 PROCEDURE — 99214 OFFICE O/P EST MOD 30 MIN: CPT | Mod: GT

## 2024-12-18 RX ORDER — PREGABALIN 100 MG/1
100 CAPSULE ORAL 3 TIMES DAILY
Qty: 90 CAPSULE | Refills: 0 | Status: SHIPPED | OUTPATIENT
Start: 2025-01-22 | End: 2025-02-21

## 2024-12-18 RX ORDER — PREGABALIN 100 MG/1
100 CAPSULE ORAL 3 TIMES DAILY
Qty: 90 CAPSULE | Refills: 0 | Status: SHIPPED | OUTPATIENT
Start: 2025-02-19 | End: 2025-03-21

## 2024-12-18 ASSESSMENT — ENCOUNTER SYMPTOMS
HEADACHES: 0
DIZZINESS: 0
NERVOUS/ANXIOUS: 1
DEPRESSION: 0
INSOMNIA: 0

## 2024-12-18 ASSESSMENT — LIFESTYLE VARIABLES: SUBSTANCE_ABUSE: 0

## 2024-12-18 NOTE — ASSESSMENT & PLAN NOTE
Problem type: Chronic Illness, Stable     Assessment: History of alcoholism: 750 milliliters of vodka a day.  Past symptoms of cravings, compulsion, poor consequences, deteriorating social relationships, dependence, and tolerance.  History of emergency department visits for detox.  Sustain sobriety since July 2023 after [facility] visit.  States she is not craving alcohol now.        Plan  Medication:   -Pregabalin was initiated for general anxiety disorder with agoraphobia.  However, it may be helping patient with her alcohol use disorder.  Patient states she is not craving any alcohol now.     Therapy: n/a     Other Orders: n/a

## 2024-12-18 NOTE — ASSESSMENT & PLAN NOTE
Problem type: Chronic Illness with exacerbation, progression, or side effects of treatment     Assessment: History of EDEL with exacerbation in February/March 2024 after needing to put down emotional support animal. Used to unable to leave house without , has worsened depression and mood due to inability to do things. Improvement of symptoms, decrease derailment, better concentration and ability to control catastrophizing with TID dosing of pregabalin vs. QHS. Goals to decrease anxiety to be able to visit family by being unable to get on highways ([place]), and increased time being able to sitting, driving on the highway to be able to go to clinic in person for future policy change for controlled substances in [facility].  Patient has been able to go outside for 6-7 hours, versus 10-20 minutes.  However, still has lots of anxiety about leaving house.  We will continue to monitor.  Current doses of pregabalin are working well right now.       Plan  Medication:   -Continue pregabalin 100mg TID p.o. for anxiety.   -Continue citalopram 40 mg p.o. daily for depression and anxiety   -Continue propranolol 10 mg p.o. twice daily to 3 times daily as needed for anxiety; consider cessation in future if anxiety better controlled with pregabalin, or titrating up on propranolol as needed dose for situational help for anxiety symptoms.  -Continue trazodone 50 mg p.o. nightly as needed for sleep, consider cessation/taper in future due to CNS effects.     Therapy:   Patient is still against therapy, states that had bad prior experiences.  Will continue to reiterate the many of these symptoms anxiety/medications treating are not related to some of the symptoms patient complains about.  I.e: Spiraling thoughts.  Patient did not do mood log, continue to encourage .     Other Orders: None

## 2024-12-18 NOTE — ASSESSMENT & PLAN NOTE
Problem type: Chronic Illness, Stable     Assessment: Precipitated by passing of emotional support animal (GILBERTO) approximately in March/February 2024. Improving Low mood, no anhedonia/sense of guilt/helplessness/decreased appetite. ,  improved decreased energy, improved decreased concentration. Denied SI/HI.   Continue to follow-up, addressing symptoms. Symptoms worsened due to housebound status with anxiety without GILBERTO. May consider switching celexa (although patient states well controlled right now) or tapering after EDEL more well controlled.     Plan  Medication:   -Continue Celexa 40 mg daily for depression and anxiety as above.      Therapy: N/A     Other Orders: n/A

## 2024-12-18 NOTE — PROGRESS NOTES
"[facility] Outpatient Psychiatric Follow Up Note  Evaluation completed by: Evelia Carson D.O.   Date of Service: 12/18/2024  Appointment type: in-office appointment.  Attending:  Ninfa Stahl M.D.  Information below was collected from: patient    CHIEF COMPLIANT:  \"I think I want to try coming into the office\"      HPI:   Johanne Raya is a 44 y.o. old female who presents today for regularly scheduled follow up for assessment of generalized anxiety with agoraphobia, sustained remission alcohol use disorder, adjustment disorder with mood disturbances.    Mood has improved, no suicidal ideations, patient delightfully states that she wants to try to come into the office, however, due to holidays and conflicting clinic schedule cannot see patient the next 2 Wednesdays.  Continue to promote and encourage patient to try and come into person, and do outdoor activities for exposure.    Continues to state takes medications as prescribed, pregabalin continues to work, no need to change.  Denies side effects with continued tolerance of medication.  States continues to use trazodone, however, as a as needed basis approximately 2-3 times a week.  Continues taking propranolol for getting into cars specifically intermittently throughout the week.  Mood has improved over the holidays, with increase in confidence at being able to go into in person visits, less sadness regarding passing of dog.    PSYCHIATRIC REVIEW OF SYSTEMS:current symptoms as reported by pt.  Depression: Depressed mood, Difficulty sleeping, and Difficulty concentrating  Jacey: Patient denies any change in mood, increased energy, or marked irritability  Anxiety/Panic Attacks: palpitations, insomnia, feelings of losing control, difficulty concentrating  Trauma: Patient reports no signs or symptoms indicative of PTSD  Psychosis: Patient reports no signs or symptoms indicative of psychosis      CURRENT MEDICATIONS    Current Outpatient " "Medications:     [START ON 1/22/2025] pregabalin (LYRICA) 100 MG Cap, Take 1 Capsule by mouth in the morning, at noon, and at bedtime for 30 days., Disp: 90 Capsule, Rfl: 0    [START ON 2/19/2025] pregabalin (LYRICA) 100 MG Cap, Take 1 Capsule by mouth in the morning, at noon, and at bedtime for 30 days., Disp: 90 Capsule, Rfl: 0    citalopram (CELEXA) 40 MG Tab, Take 1 Tablet by mouth every day for 360 days., Disp: 90 Tablet, Rfl: 3    traZODone (DESYREL) 50 MG Tab, Take 1 Tablet by mouth at bedtime as needed for Sleep (Try Melatonin 2-3 hours before sleep before trazodone.)., Disp: 20 Tablet, Rfl: 4    pregabalin (LYRICA) 100 MG Cap, Take 1 Capsule by mouth in the morning, at noon, and at bedtime for 30 days., Disp: 90 Capsule, Rfl: 0    [START ON 1/22/2025] pregabalin (LYRICA) 100 MG Cap, Take 1 Capsule by mouth in the morning, at noon, and at bedtime for 30 days., Disp: 90 Capsule, Rfl: 0     REVIEW OF SYSTEMS   Review of Systems   Neurological:  Negative for dizziness and headaches.   Psychiatric/Behavioral:  Negative for depression, substance abuse and suicidal ideas. The patient is nervous/anxious (\"It's better, I think I want to try to go in\"). The patient does not have insomnia.      Neurologic: no tics, tremors, dyskinesias. The patient denies dizziness, syncope, falls. Ambulates independently    PAST MEDICAL HISTORY  Past Medical History:   Diagnosis Date    Alcohol abuse     Anxiety     Anxiety and depression     Depression     Snoring     no sleep study    Suicide attempt (HCC)      Allergies   Allergen Reactions    Nsaids      Other reaction(s): GI bleeding    Penicillins Rash     Past Surgical History:   Procedure Laterality Date    CT LAP OCTAVIA RESTRICT PROC LONGITUDINAL GAS*  09/15/2021    Procedure: GASTRECTOMY, SLEEVE, LAPAROSCOPIC;  Surgeon: Regulo Hugo M.D.;  Location: SURGERY [facility];  Service: General    APPENDECTOMY  2014    TUBAL LIGATION  2010    OTHER ORTHOPEDIC SURGERY Left 2010    " foot surgery to remove extra bone    TUBAL COAGULATION LAPAROSCOPIC BILATERAL Bilateral 2009    PRIMARY C SECTION  2005        TONSILLECTOMY      as a child      Family History   Problem Relation Age of Onset    Pancreatic Cancer Father     Drug abuse Sister     Depression Sister     Schizophrenia Sister     Hodgkin's Lymphoma Maternal Grandfather      Social History     Socioeconomic History    Marital status:     Highest education level: 12th grade   Tobacco Use    Smoking status: Every Day     Current packs/day: 1.00     Average packs/day: 1 pack/day for 29.5 years (29.5 ttl pk-yrs)     Types: Cigarettes     Start date: 1995     Passive exposure: Current    Smokeless tobacco: Never   Vaping Use    Vaping status: Never Used   Substance and Sexual Activity    Alcohol use: Not Currently     Comment: when able to drink consumes 1/5    Drug use: Not Currently     Types: Inhaled     Comment: marijuana    Sexual activity: Yes     Partners: Male     Comment: Tubes tied in early 30s.   Social History Narrative    Social Determinants of Health    Alcohol Use: Alcohol Misuse (2023)        AUDIT-C            Frequency of Alcohol Consumption: Patient declined            Average Number of Drinks: 10 or more            Frequency of Binge Drinking: Patient declined    Depression: At risk (2024)        PHQ-2            PHQ-2 Score: 5    Financial Resource Strain: Patient Declined (2023)        Overall Financial Resource Strain (CARDIA)            Difficulty of Paying Living Expenses: Patient declined    Food Insecurity: Patient Declined (2023)        Hunger Vital Sign            Worried About Running Out of Food in the Last Year: Patient declined            Ran Out of Food in the Last Year: Patient declined    Housing Stability: Low Risk  (2023)        Housing Stability Vital Sign            Unable to Pay for Housing in the Last Year: No            Number of Places Lived in the  Last Year: 1            Unstable Housing in the Last Year: No    Intimate Partner Violence: Not on file    Physical Activity: Inactive (9/20/2023)        Exercise Vital Sign            Days of Exercise per Week: 0 days            Minutes of Exercise per Session: 0 min    Social Connections: Unknown (9/20/2023)        Social Connection and Isolation Panel [NHANES]            Frequency of Communication with Friends and Family: More than three times a week            Frequency of Social Gatherings with Friends and Family: Patient declined            Attends Voodoo Services: Patient declined            Active Member of Clubs or Organizations: No            Attends Club or Organization Meetings: More than 4 times per year            Marital Status:     Stress: Stress Concern Present (9/20/2023)        [employer] [place] [employer] - Occupational Stress Questionnaire            Feeling of Stress : Very much    Tobacco Use: Medium Risk (7/24/2024)        Patient History            Smoking Tobacco Use: Former            Smokeless Tobacco Use: Never            Passive Exposure: Not on file    Transportation Needs: No Transportation Needs (9/20/2023)        PRAPARE - Transportation            Lack of Transportation (Medical): No            Lack of Transportation (Non-Medical): No    Utilities: Not on file      Social Drivers of Health     Financial Resource Strain: Patient Declined (9/20/2023)    Overall Financial Resource Strain (CARDIA)     Difficulty of Paying Living Expenses: Patient declined   Food Insecurity: Patient Declined (9/20/2023)    Hunger Vital Sign     Worried About Running Out of Food in the Last Year: Patient declined     Ran Out of Food in the Last Year: Patient declined   Transportation Needs: No Transportation Needs (9/20/2023)    PRAPARE - Transportation     Lack of Transportation (Medical): No     Lack of Transportation (Non-Medical): No   Physical Activity: Inactive (9/20/2023)     "Exercise Vital Sign     Days of Exercise per Week: 0 days     Minutes of Exercise per Session: 0 min   Stress: Stress Concern Present (2023)    [employer] [place] [employer] - Occupational Stress Questionnaire     Feeling of Stress : Very much   Social Connections: Unknown (2023)    Social Connection and Isolation Panel [NHANES]     Frequency of Communication with Friends and Family: More than three times a week     Frequency of Social Gatherings with Friends and Family: Patient declined     Attends Jew Services: Patient declined     Active Member of Clubs or Organizations: No     Attends Club or Organization Meetings: More than 4 times per year     Marital Status:    Housing Stability: Low Risk  (2023)    Housing Stability Vital Sign     Unable to Pay for Housing in the Last Year: No     Number of Places Lived in the Last Year: 1     Unstable Housing in the Last Year: No     Past Surgical History:   Procedure Laterality Date    AR LAP OCTAVIA RESTRICT PROC LONGITUDINAL GAS*  09/15/2021    Procedure: GASTRECTOMY, SLEEVE, LAPAROSCOPIC;  Surgeon: Regulo Hugo M.D.;  Location: SURGERY [facility];  Service: General    APPENDECTOMY  2014    TUBAL LIGATION  2010    OTHER ORTHOPEDIC SURGERY Left 2010    foot surgery to remove extra bone    TUBAL COAGULATION LAPAROSCOPIC BILATERAL Bilateral 2009    PRIMARY C SECTION  2005        TONSILLECTOMY      as a child       PSYCHIATRIC EXAMINATION   There were no vitals taken for this visit.  Musculoskeletal: No abnormal movements noted  Appearance: well-developed, well-nourished, appears stated age, and fair hygiene, cooperative, engaged, friendly, and pleasant  Thought Process:  linear and coherent  Abnormal or Psychotic Thoughts: No evidence of auditory or visual hallucinations, and no overt delusions noted  Speech: regular rate, rhythm, volume, tone, and syntax  Mood: \"good\"  Affect: euthymic and congruent with mood  SI/HI: " Denies SI and HI  Orientation: alert and oriented  Recent and Remote Memory: no gross impairment in immediate, recent, or remote memory  Attention Span and Concentration: Appropriate for conversation  Insight/Judgement into symptoms: good  Neurological Testing (MSSE Score and/or clock drawing): MMSE not performed during this encounter    SCREENINGS:      7/24/2024     9:00 AM 8/21/2024    10:00 AM 11/27/2024    10:30 AM   Depression Screen (PHQ-2/PHQ-9)   PHQ-2 Total Score 5 5 3   PHQ-9 Total Score 17 19 9         11/27/2024    11:07 AM 8/21/2024    10:23 AM 7/24/2024     9:34 AM 9/21/2023     8:45 AM    EDEL-7 ANXIETY SCALE FLOWSHEET   Feeling nervous, anxious, or on edge 2 2 3 3   Not being able to stop or control worrying 2 2 3 3   Worrying too much about different things 1 2 3 3   Trouble relaxing 1 3 3 3   Being so restless that it is hard to sit still 1 1 3 3   Becoming easily annoyed or irritable 1 3 1 3   Feeling afraid as if something awful might happen 1 2 3 3   EDEL-7 Total Score 9 15 19 21   How difficult have these problems made it for you to do your work, take care of things at home, or get along with other people? Very difficult Very difficult         PREVENTATIVE CARE  Medication Monitoring: Pregabalin educated patient on: abuse, tolerance and withdrawal, risk of falls, avoiding combining with alcohol and opioids, and medication exit plan.     [place]  records   reviewed.  No concerns about misuse of controlled substance.    CURRENT RISK ASSESSMENT       Suicide: Not applicable       Homicide: Not applicable       Self-Harm: Not applicable       Relapse: Low       Crisis Safety Plan Reviewed Not Indicated    ASSESSMENT/DIAGNOSES/PLAN  Problem List Items Addressed This Visit          Psychiatry Problems    EDEL (generalized anxiety disorder)     Problem type: Chronic Illness with exacerbation, progression, or side effects of treatment     Assessment: History of EDEL with exacerbation in February/March  2024 after needing to put down emotional support animal. Used to unable to leave house without , has worsened depression and mood due to inability to do things. Improvement of symptoms, decrease derailment, better concentration and ability to control catastrophizing with TID dosing of pregabalin vs. QHS. Goals to decrease anxiety to be able to visit family by being unable to get on highways ([place]), and increased time being able to sitting, driving on the highway to be able to go to clinic in person for future policy change for controlled substances in [facility].  Patient has been able to go outside for 6-7 hours, versus 10-20 minutes.  However, still has lots of anxiety about leaving house.  We will continue to monitor.  Current doses of pregabalin are working well right now.       Plan  Medication:   -Continue pregabalin 100mg TID p.o. for anxiety.   -Continue citalopram 40 mg p.o. daily for depression and anxiety   -Continue propranolol 10 mg p.o. twice daily to 3 times daily as needed for anxiety; consider cessation in future if anxiety better controlled with pregabalin, or titrating up on propranolol as needed dose for situational help for anxiety symptoms.  -Continue trazodone 50 mg p.o. nightly as needed for sleep, consider cessation/taper in future due to CNS effects.     Therapy:   Patient is still against therapy, states that had bad prior experiences.  Will continue to reiterate the many of these symptoms anxiety/medications treating are not related to some of the symptoms patient complains about.  I.e: Spiraling thoughts.  Patient did not do mood log, continue to encourage .     Other Orders: None             Relevant Medications    pregabalin (LYRICA) 100 MG Cap (Start on 1/22/2025)    pregabalin (LYRICA) 100 MG Cap (Start on 2/19/2025)    Current episode of major depressive disorder without prior episode     Problem type: Chronic Illness, Stable     Assessment: Precipitated by passing of  emotional support animal (GILBERTO) approximately in March/February 2024. Improving Low mood, no anhedonia/sense of guilt/helplessness/decreased appetite. ,  improved decreased energy, improved decreased concentration. Denied SI/HI.   Continue to follow-up, addressing symptoms. Symptoms worsened due to housebound status with anxiety without GILBERTO. May consider switching celexa (although patient states well controlled right now) or tapering after EDEL more well controlled.     Plan  Medication:   -Continue Celexa 40 mg daily for depression and anxiety as above.      Therapy: N/A     Other Orders: n/A          Agoraphobia     Problem type: Chronic Illness, Stable    Assessment: Has been doing well with pregabalin, states has been able to go outside for 6-7 hours versus 10-20 minutes before pregabalin initiation.  Prefers pregabalin to be dosed 3 times a day instead of extended release.    Plan  Medication: Pregabalin 100 mg 3 times a day (8:30 AM, 1 PM, 6 PM is when patient takes it)    Therapy: Not applicable, continued to recommend therapy    Other Orders: Not applicable         Relevant Medications    pregabalin (LYRICA) 100 MG Cap (Start on 1/22/2025)    pregabalin (LYRICA) 100 MG Cap (Start on 2/19/2025)       Other    Alcohol use disorder, severe, in sustained remission (HCC)     Problem type: Chronic Illness, Stable     Assessment: History of alcoholism: 750 milliliters of vodka a day.  Past symptoms of cravings, compulsion, poor consequences, deteriorating social relationships, dependence, and tolerance.  History of emergency department visits for detox.  Sustain sobriety since July 2023 after [facility] visit.  States she is not craving alcohol now.        Plan  Medication:   -Pregabalin was initiated for general anxiety disorder with agoraphobia.  However, it may be helping patient with her alcohol use disorder.  Patient states she is not craving any alcohol now.     Therapy: n/a     Other Orders: n/a          Relevant  Medications    pregabalin (LYRICA) 100 MG Cap (Start on 1/22/2025)    pregabalin (LYRICA) 100 MG Cap (Start on 2/19/2025)          Medication options, alternatives (including no medications) and medication risks/benefits/side effects were discussed in detail.  The patient was advised to call, message clinician on [employer], or come in to the clinic if symptoms worsen or if questions/issues regarding their medications arise.  The patient verbalized understanding and agreement.    The patient was educated to call 911, call the suicide hotline, or go to the local ER if having thoughts of suicide or homicide.  The patient verbalized understanding and agreement.   The proposed treatment plan was discussed with the patient who was provided the opportunity to ask questions and make suggestions regarding alternative treatment. Patient verbalized understanding and expressed agreement with the plan.      Follow up in 4 weeks for behavioral management of in-person visit for exposure, no medication changes right now.      This appointment was supervised by attending psychiatrist, Ninfa Stahl M.D., who agrees with assessment and treatment plan.  See attending attestation for more details.

## 2025-03-06 DIAGNOSIS — F32.0 CURRENT MILD EPISODE OF MAJOR DEPRESSIVE DISORDER WITHOUT PRIOR EPISODE (HCC): ICD-10-CM

## 2025-03-12 RX ORDER — TRAZODONE HYDROCHLORIDE 50 MG/1
TABLET ORAL
Qty: 90 TABLET | Refills: 1 | Status: SHIPPED | OUTPATIENT
Start: 2025-03-12

## 2025-03-23 DIAGNOSIS — F41.9 ANXIETY: ICD-10-CM

## 2025-03-24 NOTE — TELEPHONE ENCOUNTER
Received request via: Pharmacy    Was the patient seen in the last year in this department? No    Does the patient have an active prescription (recently filled or refills available) for medication(s) requested? No    Pharmacy Name: [facility]/pharmacy #[id number] - [place], [place] - [id number]

## 2025-03-30 RX ORDER — CITALOPRAM HYDROBROMIDE 20 MG/1
TABLET ORAL
Qty: 90 TABLET | Refills: 2 | Status: SHIPPED | OUTPATIENT
Start: 2025-03-30

## (undated) DEVICE — SET TUBING PNEUMOCLEAR HIGH FLOW SMOKE EVACUATION (10EA/BX)

## (undated) DEVICE — RELOAD WITH GRIPPING SURFACE TECHNOLOGY GOLD 60MM (12EA/BX)

## (undated) DEVICE — DRAPESURG STERI-DRAPE LONG - (10/BX 4BX/CA)

## (undated) DEVICE — PERISTRIP 60 STAPLE LINE REINFORCEMENT (6EA/CA)

## (undated) DEVICE — HANDPIECE THUNDERBEAT 5MM 35CM FRONT GRIP TYPE S (5EA/BX)

## (undated) DEVICE — TROCAR 5X100 NON BLADED Z-TH - READ KII (6/BX)

## (undated) DEVICE — TUBING CLEARLINK DUO-VENT - C-FLO (48EA/CA)

## (undated) DEVICE — ELECTRODE 850 FOAM ADHESIVE - HYDROGEL RADIOTRNSPRNT (50/PK)

## (undated) DEVICE — ELECTRODE DUAL RETURN W/ CORD - (50/PK)

## (undated) DEVICE — GLOVE SZ 7 BIOGEL PI MICRO - PF LF (50PR/BX 4BX/CA)

## (undated) DEVICE — SUTURE2-0 27IN VCRL ANTI VIOL (36PK/BX)

## (undated) DEVICE — CHLORAPREP 26 ML APPLICATOR - ORANGE TINT(25/CA)

## (undated) DEVICE — SODIUM CHL IRRIGATION 0.9% 1000ML (12EA/CA)

## (undated) DEVICE — SUTURE GENERAL

## (undated) DEVICE — KIT ANESTHESIA W/CIRCUIT & 3/LT BAG W/FILTER (20EA/CA)

## (undated) DEVICE — CANNULA W/SEAL 5X100 Z-THRE - ADED KII (12/BX)

## (undated) DEVICE — SUTURE 4-0 VICRYL PLUSFS-1 - 27 INCH (36/BX)

## (undated) DEVICE — BAG RETRIEVAL 12/15 MM INZII (5EA/CA) THIS WILL REPLACE ITEM 75018

## (undated) DEVICE — RELOAD WITH GRIPPING SURGACE TECHNOLOGY GREEN 60MM (12EA/BX)

## (undated) DEVICE — TISSEEL 4ML ----MUST ORDER A MIN OF 6EA----

## (undated) DEVICE — MAT PATIENT POSITIONING PREVALON (10EA/CA)

## (undated) DEVICE — BANDAID SHEER STRIP 3/4 IN (100EA/BX 12BX/CA)

## (undated) DEVICE — SUTURE 0 LIGATING REEL VICRYL PLUS (12PK/BX)

## (undated) DEVICE — HEAD HOLDER JUNIOR/ADULT

## (undated) DEVICE — LACTATED RINGERS INJ 1000 ML - (14EA/CA 60CA/PF)

## (undated) DEVICE — TROCAR SEPARATOR 15MMZTHREAD - (6/BX)

## (undated) DEVICE — STAPLER POWERED 60MM (3EA/BX)

## (undated) DEVICE — CANISTER SUCTION 3000ML MECHANICAL FILTER AUTO SHUTOFF MEDI-VAC NONSTERILE LF DISP  (40EA/CA)

## (undated) DEVICE — GLOVE BIOGEL PI INDICATOR SZ 7.0 SURGICAL PF LF - (50/BX 4BX/CA)

## (undated) DEVICE — RELOAD WITH GRIPPING SURFACE TECHNOLOGY BLUE 60MM (12EA/BX)

## (undated) DEVICE — SYSTEM CALIBARATION  GASTRECTOMY 40FR WITH BULB

## (undated) DEVICE — MASK ANESTHESIA ADULT  - (100/CA)

## (undated) DEVICE — SCISSORS 5MM CVD (6EA/BX)

## (undated) DEVICE — SLEEVE, VASO, REPROC, LARGE

## (undated) DEVICE — SUCTION INSTRUMENT YANKAUER BULBOUS TIP W/O VENT (50EA/CA)

## (undated) DEVICE — SET LEADWIRE 5 LEAD BEDSIDE DISPOSABLE ECG (1SET OF 5/EA)

## (undated) DEVICE — NEPTUNE 4 PORT MANIFOLD - (20/PK)

## (undated) DEVICE — SET EXTENSION WITH 2 PORTS (48EA/CA) ***PART #2C8610 IS A SUBSTITUTE*****

## (undated) DEVICE — PACK GASTRIC BANDING OR - (1/CA)

## (undated) DEVICE — CLIP APPLIER 10MM ENDO LARGE (3EA/BX)

## (undated) DEVICE — GOWN WARMING STANDARD FLEX - (30/CA)

## (undated) DEVICE — PROTECTOR ULNA NERVE - (36PR/CA)

## (undated) DEVICE — APPLICATOR DUPLO SPRAYER (5EA/CA)

## (undated) DEVICE — TOWEL STOP TIMEOUT SAFETY FLAG (40EA/CA)

## (undated) DEVICE — GOWN WARMING X-LARGE FLEX - (20/CA)

## (undated) DEVICE — SENSOR SPO2 NEO LNCS ADHESIVE (20/BX) SEE USER NOTES